# Patient Record
Sex: MALE | Race: WHITE | NOT HISPANIC OR LATINO | Employment: OTHER | URBAN - METROPOLITAN AREA
[De-identification: names, ages, dates, MRNs, and addresses within clinical notes are randomized per-mention and may not be internally consistent; named-entity substitution may affect disease eponyms.]

---

## 2017-01-04 ENCOUNTER — HOSPITAL ENCOUNTER (OUTPATIENT)
Dept: CARDIOLOGY | Facility: HOSPITAL | Age: 82
Discharge: HOME | End: 2017-01-04
Attending: INTERNAL MEDICINE | Admitting: INTERNAL MEDICINE

## 2017-03-23 ENCOUNTER — CONVERSION ENCOUNTER (OUTPATIENT)
Dept: CARDIOLOGY | Facility: CLINIC | Age: 82
End: 2017-03-23

## 2017-05-18 ENCOUNTER — CONVERSION ENCOUNTER (OUTPATIENT)
Dept: CARDIOLOGY | Facility: CLINIC | Age: 82
End: 2017-05-18

## 2017-12-13 ENCOUNTER — CONVERSION ENCOUNTER (OUTPATIENT)
Dept: CARDIOLOGY | Facility: CLINIC | Age: 82
End: 2017-12-13

## 2018-04-19 ENCOUNTER — CONVERSION ENCOUNTER (OUTPATIENT)
Dept: CARDIOLOGY | Facility: CLINIC | Age: 83
End: 2018-04-19

## 2018-10-10 ENCOUNTER — HOSPITAL ENCOUNTER (OUTPATIENT)
Dept: ONCOLOGY | Facility: HOSPITAL | Age: 83
Discharge: HOME | End: 2018-10-10
Attending: INTERNAL MEDICINE | Admitting: INTERNAL MEDICINE

## 2018-10-10 ENCOUNTER — HOSPITAL ENCOUNTER (OUTPATIENT)
Dept: ONCOLOGY | Facility: CLINIC | Age: 83
Setting detail: INFUSION SERIES
Discharge: HOME | End: 2018-10-10
Attending: INTERNAL MEDICINE | Admitting: INTERNAL MEDICINE

## 2018-10-10 ENCOUNTER — CLINICAL SUPPORT (OUTPATIENT)
Dept: ONCOLOGY | Facility: HOSPITAL | Age: 83
End: 2018-10-10

## 2018-10-10 LAB
IRON SATN MFR SERPL: 16 % (ref 20–50)
IRON SERPL-MCNC: 45 UG/DL (ref 45–182)
TIBC SERPL-MCNC: 280 UG/DL (ref 228–428)

## 2019-06-04 VITALS
SYSTOLIC BLOOD PRESSURE: 130 MMHG | OXYGEN SATURATION: 94 % | OXYGEN SATURATION: 95 % | OXYGEN SATURATION: 93 % | SYSTOLIC BLOOD PRESSURE: 143 MMHG | SYSTOLIC BLOOD PRESSURE: 132 MMHG | HEART RATE: 98 BPM | DIASTOLIC BLOOD PRESSURE: 80 MMHG | HEART RATE: 82 BPM | WEIGHT: 181 LBS | WEIGHT: 195 LBS | SYSTOLIC BLOOD PRESSURE: 143 MMHG | HEART RATE: 84 BPM | OXYGEN SATURATION: 91 % | HEART RATE: 87 BPM | WEIGHT: 191 LBS | DIASTOLIC BLOOD PRESSURE: 76 MMHG | WEIGHT: 185.5 LBS | DIASTOLIC BLOOD PRESSURE: 81 MMHG | DIASTOLIC BLOOD PRESSURE: 87 MMHG

## 2019-11-20 ENCOUNTER — CLINICAL SUPPORT NO REQUIREMENTS (OUTPATIENT)
Dept: CARDIOLOGY | Facility: CLINIC | Age: 84
End: 2019-11-20

## 2019-11-20 ENCOUNTER — TELEPHONE (OUTPATIENT)
Dept: CARDIOLOGY | Facility: CLINIC | Age: 84
End: 2019-11-20

## 2019-11-20 ENCOUNTER — APPOINTMENT (OUTPATIENT)
Dept: GENERAL RADIOLOGY | Facility: HOSPITAL | Age: 84
End: 2019-11-20

## 2019-11-20 ENCOUNTER — HOSPITAL ENCOUNTER (EMERGENCY)
Facility: HOSPITAL | Age: 84
Discharge: HOME | End: 2019-11-20
Attending: EMERGENCY MEDICINE | Admitting: EMERGENCY MEDICINE

## 2019-11-20 VITALS
DIASTOLIC BLOOD PRESSURE: 73 MMHG | BODY MASS INDEX: 29.55 KG/M2 | RESPIRATION RATE: 16 BRPM | WEIGHT: 156.53 LBS | SYSTOLIC BLOOD PRESSURE: 122 MMHG | TEMPERATURE: 98.1 F | HEART RATE: 61 BPM | OXYGEN SATURATION: 98 % | HEIGHT: 61 IN

## 2019-11-20 DIAGNOSIS — R55 SYNCOPE AND COLLAPSE: Primary | ICD-10-CM

## 2019-11-20 DIAGNOSIS — R07.9 CHEST PAIN, UNSPECIFIED TYPE: Primary | ICD-10-CM

## 2019-11-20 DIAGNOSIS — Z95.818 STATUS POST PLACEMENT OF IMPLANTABLE LOOP RECORDER: ICD-10-CM

## 2019-11-20 LAB
ANION GAP SERPL CALCULATED.3IONS-SCNC: 9 MMOL/L (ref 5–15)
BASOPHILS # BLD AUTO: 0 10*3/MM3 (ref 0–0.2)
BASOPHILS NFR BLD AUTO: 0.2 % (ref 0–1.5)
BUN BLD-MCNC: 12 MG/DL (ref 8–23)
BUN/CREAT SERPL: 13.5 (ref 7–25)
CALCIUM SPEC-SCNC: 8.9 MG/DL (ref 8.6–10.5)
CHLORIDE SERPL-SCNC: 100 MMOL/L (ref 98–107)
CO2 SERPL-SCNC: 29 MMOL/L (ref 22–29)
CREAT BLD-MCNC: 0.89 MG/DL (ref 0.76–1.27)
DEPRECATED RDW RBC AUTO: 55.6 FL (ref 37–54)
DIGOXIN SERPL-MCNC: 0.6 NG/ML (ref 0.6–1.2)
EOSINOPHIL # BLD AUTO: 0 10*3/MM3 (ref 0–0.4)
EOSINOPHIL NFR BLD AUTO: 0.3 % (ref 0.3–6.2)
ERYTHROCYTE [DISTWIDTH] IN BLOOD BY AUTOMATED COUNT: 16.1 % (ref 12.3–15.4)
GFR SERPL CREATININE-BSD FRML MDRD: 81 ML/MIN/1.73
GLUCOSE BLD-MCNC: 159 MG/DL (ref 65–99)
HCT VFR BLD AUTO: 39.4 % (ref 37.5–51)
HGB BLD-MCNC: 12.8 G/DL (ref 13–17.7)
HOLD SPECIMEN: NORMAL
LYMPHOCYTES # BLD AUTO: 1.9 10*3/MM3 (ref 0.7–3.1)
LYMPHOCYTES NFR BLD AUTO: 25.8 % (ref 19.6–45.3)
MCH RBC QN AUTO: 31.4 PG (ref 26.6–33)
MCHC RBC AUTO-ENTMCNC: 32.6 G/DL (ref 31.5–35.7)
MCV RBC AUTO: 96.4 FL (ref 79–97)
MONOCYTES # BLD AUTO: 1.6 10*3/MM3 (ref 0.1–0.9)
MONOCYTES NFR BLD AUTO: 22 % (ref 5–12)
NEUTROPHILS # BLD AUTO: 3.8 10*3/MM3 (ref 1.7–7)
NEUTROPHILS NFR BLD AUTO: 51.7 % (ref 42.7–76)
NRBC BLD AUTO-RTO: 0.1 /100 WBC (ref 0–0.2)
PLATELET # BLD AUTO: 88 10*3/MM3 (ref 140–450)
PMV BLD AUTO: 11 FL (ref 6–12)
POTASSIUM BLD-SCNC: 3.9 MMOL/L (ref 3.5–5.2)
RBC # BLD AUTO: 4.08 10*6/MM3 (ref 4.14–5.8)
SODIUM BLD-SCNC: 138 MMOL/L (ref 136–145)
TROPONIN T SERPL-MCNC: <0.01 NG/ML (ref 0–0.03)
WBC NRBC COR # BLD: 7.4 10*3/MM3 (ref 3.4–10.8)

## 2019-11-20 PROCEDURE — 84484 ASSAY OF TROPONIN QUANT: CPT | Performed by: EMERGENCY MEDICINE

## 2019-11-20 PROCEDURE — 93298 REM INTERROG DEV EVAL SCRMS: CPT | Performed by: INTERNAL MEDICINE

## 2019-11-20 PROCEDURE — 93299 PR REM INTERROG ICPMS/SCRMS <30 D TECH REVIEW: CPT | Performed by: INTERNAL MEDICINE

## 2019-11-20 PROCEDURE — 85025 COMPLETE CBC W/AUTO DIFF WBC: CPT | Performed by: EMERGENCY MEDICINE

## 2019-11-20 PROCEDURE — 93005 ELECTROCARDIOGRAM TRACING: CPT | Performed by: EMERGENCY MEDICINE

## 2019-11-20 PROCEDURE — 93005 ELECTROCARDIOGRAM TRACING: CPT

## 2019-11-20 PROCEDURE — 80048 BASIC METABOLIC PNL TOTAL CA: CPT | Performed by: EMERGENCY MEDICINE

## 2019-11-20 PROCEDURE — 71045 X-RAY EXAM CHEST 1 VIEW: CPT

## 2019-11-20 PROCEDURE — 99284 EMERGENCY DEPT VISIT MOD MDM: CPT

## 2019-11-20 PROCEDURE — 80162 ASSAY OF DIGOXIN TOTAL: CPT | Performed by: EMERGENCY MEDICINE

## 2019-11-20 RX ORDER — SODIUM CHLORIDE 0.9 % (FLUSH) 0.9 %
10 SYRINGE (ML) INJECTION AS NEEDED
Status: DISCONTINUED | OUTPATIENT
Start: 2019-11-20 | End: 2019-11-20 | Stop reason: HOSPADM

## 2019-11-20 NOTE — ED PROVIDER NOTES
Subjective   Patient is an 86-year-old male sent from his extended care facility due to shortness of breath.  There is question as to whether the patient had been defibrillated by a defibrillator implant.  Patient is unable to offer complaints due to dementia.  No other information is obtainable at this time            Review of Systems   Unable to perform ROS: Dementia       Past Medical History:   Diagnosis Date   • Anemia    • Atrial fibrillation (CMS/[facility])    • COPD (chronic obstructive pulmonary disease) (CMS/[facility])    • Dementia (CMS/[facility])    • Depression    • Diabetes mellitus (CMS/[facility])    • Hyperlipidemia    • Hypertension    • Lung cancer (CMS/[facility])    • Thrombocytopenia (CMS/[facility])        Allergies   Allergen Reactions   • Ibuprofen Unknown (See Comments)     Unknown     • Nsaids Unknown (See Comments)     unknown       Past Surgical History:   Procedure Laterality Date   • OTHER SURGICAL HISTORY      Loop Recorder        History reviewed. No pertinent family history.    Social History     Socioeconomic History   • Marital status:      Spouse name: Not on file   • Number of children: Not on file   • Years of education: Not on file   • Highest education level: Not on file   Substance and Sexual Activity   • Alcohol use: No     Frequency: Never     Comment: previous            Objective   Physical Exam  HEENT exam shows TMs to be clear.  Oropharynx, spit sclerae nonicteric.  Neck has no adenopathy JVD [place] lungs are clear.  Heart has a regular rate rhythm without murmur rub or gallop her chest is nontender.  Abdomen soft nontender.  Patient has normal bowel sounds her thyroid undergone.  Back has no CVA tenderness.  Extremity exam is no cyanosis or edema.  Procedures     My EKG interpretation shows sinus bradycardia with no acute ST change      ED Course        Results for orders placed or performed during the hospital encounter of 11/20/19   Basic Metabolic Panel   Result Value Ref Range    Glucose  159 (H) 65 - 99 mg/dL    BUN 12 8 - 23 mg/dL    Creatinine 0.89 0.76 - 1.27 mg/dL    Sodium 138 136 - 145 mmol/L    Potassium 3.9 3.5 - 5.2 mmol/L    Chloride 100 98 - 107 mmol/L    CO2 29.0 22.0 - 29.0 mmol/L    Calcium 8.9 8.6 - 10.5 mg/dL    eGFR Non African Amer 81 >60 mL/min/1.73    BUN/Creatinine Ratio 13.5 7.0 - 25.0    Anion Gap 9.0 5.0 - 15.0 mmol/L   Troponin   Result Value Ref Range    Troponin T <0.010 0.000 - 0.030 ng/mL   CBC Auto Differential   Result Value Ref Range    WBC 7.40 3.40 - 10.80 10*3/mm3    RBC 4.08 (L) 4.14 - 5.80 10*6/mm3    Hemoglobin 12.8 (L) 13.0 - 17.7 g/dL    Hematocrit 39.4 37.5 - 51.0 %    MCV 96.4 79.0 - 97.0 fL    MCH 31.4 26.6 - 33.0 pg    MCHC 32.6 31.5 - 35.7 g/dL    RDW 16.1 (H) 12.3 - 15.4 %    RDW-SD 55.6 (H) 37.0 - 54.0 fl    MPV 11.0 6.0 - 12.0 fL    Platelets 88 (L) 140 - 450 10*3/mm3    Neutrophil % 51.7 42.7 - 76.0 %    Lymphocyte % 25.8 19.6 - 45.3 %    Monocyte % 22.0 (H) 5.0 - 12.0 %    Eosinophil % 0.3 0.3 - 6.2 %    Basophil % 0.2 0.0 - 1.5 %    Neutrophils, Absolute 3.80 1.70 - 7.00 10*3/mm3    Lymphocytes, Absolute 1.90 0.70 - 3.10 10*3/mm3    Monocytes, Absolute 1.60 (H) 0.10 - 0.90 10*3/mm3    Eosinophils, Absolute 0.00 0.00 - 0.40 10*3/mm3    Basophils, Absolute 0.00 0.00 - 0.20 10*3/mm3    nRBC 0.1 0.0 - 0.2 /100 WBC   Digoxin Level   Result Value Ref Range    Digoxin 0.60 0.60 - 1.20 ng/mL     Xr Chest 1 View    Result Date: 11/20/2019  Probably no acute infiltrate is identified.  Electronically Signed By-Dr. Osbaldo Curiel MD On:11/20/2019 10:50 AM This report was finalized on [id number] by Dr. Osbaldo Curiel MD.              MDM  Number of Diagnoses or Management Options  Diagnosis management comments: Patient does not have a defibrillator implanted.  The patient does have a loop recorder.  When this was interrogated it is noted that the patient has had several episodes of bradycardia with a rate of 40-50 lasting only 4 to 8 seconds over the past  several months.  No other ectopy was noted.  Patient has no evidence of acute current syndrome based on EKG and troponin.  Chest x-ray shows no acute pulmonary disease.  There is no evidence of pneumonia.  Metabolic panel is normal.  Patient remained at his baseline with normal vital signs throughout his ED visit.  Will be discharged back to his extended care facility and will see his MD for recheck.    Risk of Complications, Morbidity, and/or Mortality  Presenting problems: high  Diagnostic procedures: high  Management options: high    Patient Progress  Patient progress: stable      Final diagnoses:   Chest pain, unspecified type              Cooper Mercado MD  11/20/19 3117

## 2019-11-20 NOTE — TELEPHONE ENCOUNTER
CALL FROM SALVADOR @ [facility]. PATIENT LET THEM KNOW THIS MORNING HIS DEFIB HAS FIRED 3 TIMES OVER THE LAST TWO DAYS. COMPLAINING OF CHEST PAIN. MADE APT TODAY @ 10:30. SPOKE TO GERMAN, LET HER KNOW SITUATION. SHE ADVISED PATIENT GO TO ER. I CALLED BACK [facility] AND SPOKE WITH BRADLEY. LET HER KNOW WE WERE ADVISING ER. BRADLEY UNDERSTOOD. APT [facility].

## 2019-11-20 NOTE — TELEPHONE ENCOUNTER
[facility] ER called, confirmed patient has not been seen since 2018 in office and had a [employer] loop recorder at this time. Went to [facility] since, not sure what device patient has at this time.

## 2019-12-23 ENCOUNTER — CLINICAL SUPPORT NO REQUIREMENTS (OUTPATIENT)
Dept: CARDIOLOGY | Facility: CLINIC | Age: 84
End: 2019-12-23

## 2019-12-23 DIAGNOSIS — Z95.818 STATUS POST PLACEMENT OF IMPLANTABLE LOOP RECORDER: Primary | ICD-10-CM

## 2019-12-23 DIAGNOSIS — I48.0 PAROXYSMAL ATRIAL FIBRILLATION (HCC): ICD-10-CM

## 2019-12-23 NOTE — PROGRESS NOTES
NC, [facility] received on 11/20/19 from Hosp. Loop not connecting at this time, patient in facility. ewa

## 2020-04-15 ENCOUNTER — TELEPHONE (OUTPATIENT)
Dept: CARDIOLOGY | Facility: CLINIC | Age: 85
End: 2020-04-15

## 2020-04-15 NOTE — TELEPHONE ENCOUNTER
Brianne Reinoso with Hospice called, confirmed patient has [employer] Loop recorder, not PM or ICD.

## 2020-06-05 ENCOUNTER — APPOINTMENT (OUTPATIENT)
Dept: GENERAL RADIOLOGY | Facility: HOSPITAL | Age: 85
End: 2020-06-05

## 2020-06-05 ENCOUNTER — APPOINTMENT (OUTPATIENT)
Dept: CARDIOLOGY | Facility: HOSPITAL | Age: 85
End: 2020-06-05

## 2020-06-05 ENCOUNTER — HOSPITAL ENCOUNTER (OUTPATIENT)
Facility: HOSPITAL | Age: 85
Setting detail: OBSERVATION
Discharge: NURSING, REHAB OR LONG-TERM CARE | End: 2020-06-08
Attending: EMERGENCY MEDICINE | Admitting: INTERNAL MEDICINE

## 2020-06-05 ENCOUNTER — APPOINTMENT (OUTPATIENT)
Dept: CT IMAGING | Facility: HOSPITAL | Age: 85
End: 2020-06-05

## 2020-06-05 DIAGNOSIS — J90 PLEURAL EFFUSION: ICD-10-CM

## 2020-06-05 DIAGNOSIS — S01.01XA LACERATION OF SCALP, INITIAL ENCOUNTER: ICD-10-CM

## 2020-06-05 DIAGNOSIS — F11.20 NARCOTIC DEPENDENCE (HCC): ICD-10-CM

## 2020-06-05 DIAGNOSIS — T14.8XXA MULTIPLE SKIN TEARS: ICD-10-CM

## 2020-06-05 DIAGNOSIS — R55 SYNCOPE AND COLLAPSE: Primary | ICD-10-CM

## 2020-06-05 PROBLEM — D64.9 ANEMIA: Chronic | Status: ACTIVE | Noted: 2019-08-16

## 2020-06-05 PROBLEM — R45.1 RESTLESSNESS AND AGITATION: Status: ACTIVE | Noted: 2019-08-16
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
## 2020-06-05 PROBLEM — M62.81 MUSCLE WEAKNESS: Status: ACTIVE | Noted: 2019-08-16

## 2020-06-05 PROBLEM — E78.5 HYPERLIPIDEMIA: Chronic | Status: ACTIVE | Noted: 2020-06-05

## 2020-06-05 PROBLEM — J44.9 CHRONIC OBSTRUCTIVE LUNG DISEASE (HCC): Chronic | Status: ACTIVE | Noted: 2019-08-16

## 2020-06-05 PROBLEM — R73.09 ELEVATED RANDOM BLOOD GLUCOSE LEVEL: Status: ACTIVE | Noted: 2020-06-05

## 2020-06-05 PROBLEM — E78.5 HYPERLIPIDEMIA: Status: ACTIVE | Noted: 2020-06-05

## 2020-06-05 PROBLEM — I48.0 PAROXYSMAL ATRIAL FIBRILLATION (HCC): Chronic | Status: ACTIVE | Noted: 2019-12-23

## 2020-06-05 PROBLEM — F03.90 DEMENTIA (HCC): Chronic | Status: ACTIVE | Noted: 2019-08-16

## 2020-06-05 PROBLEM — K21.9 GASTROESOPHAGEAL REFLUX DISEASE: Chronic | Status: ACTIVE | Noted: 2019-08-16

## 2020-06-05 PROBLEM — J44.9 CHRONIC OBSTRUCTIVE LUNG DISEASE (HCC): Status: ACTIVE | Noted: 2019-08-16

## 2020-06-05 PROBLEM — D64.9 ANEMIA: Status: ACTIVE | Noted: 2019-08-16

## 2020-06-05 PROBLEM — I10 HYPERTENSION: Status: ACTIVE | Noted: 2019-08-16

## 2020-06-05 PROBLEM — C34.90 MALIGNANT NEOPLASM OF LUNG (HCC): Status: ACTIVE | Noted: 2019-08-16

## 2020-06-05 PROBLEM — F03.90 DEMENTIA (HCC): Status: ACTIVE | Noted: 2019-08-16

## 2020-06-05 PROBLEM — G40.909 SEIZURE DISORDER (HCC): Chronic | Status: ACTIVE | Noted: 2020-06-05

## 2020-06-05 PROBLEM — Z95.0 CARDIAC PACEMAKER IN SITU: Status: ACTIVE | Noted: 2019-08-16

## 2020-06-05 PROBLEM — I10 HYPERTENSION: Chronic | Status: ACTIVE | Noted: 2019-08-16

## 2020-06-05 PROBLEM — K21.9 GASTROESOPHAGEAL REFLUX DISEASE: Status: ACTIVE | Noted: 2019-08-16

## 2020-06-05 LAB
ACANTHOCYTES BLD QL SMEAR: ABNORMAL
ALBUMIN SERPL-MCNC: 3.1 G/DL (ref 3.5–5.2)
ALBUMIN/GLOB SERPL: 1.3 G/DL
ALP SERPL-CCNC: 79 U/L (ref 39–117)
ALT SERPL W P-5'-P-CCNC: 15 U/L (ref 1–41)
ANION GAP SERPL CALCULATED.3IONS-SCNC: 6 MMOL/L (ref 5–15)
ANION GAP SERPL CALCULATED.3IONS-SCNC: 8 MMOL/L (ref 5–15)
ANISOCYTOSIS BLD QL: ABNORMAL
APTT PPP: 24.1 SECONDS (ref 24–31)
AST SERPL-CCNC: 17 U/L (ref 1–40)
BH CV ECHO MEAS - % IVS THICK: 49.4 %
BH CV ECHO MEAS - % LVPW THICK: 25.9 %
BH CV ECHO MEAS - ACS: 2 CM
BH CV ECHO MEAS - AO MAX PG (FULL): 1.4 MMHG
BH CV ECHO MEAS - AO MAX PG: 3.7 MMHG
BH CV ECHO MEAS - AO MEAN PG (FULL): 0.97 MMHG
BH CV ECHO MEAS - AO MEAN PG: 2.3 MMHG
BH CV ECHO MEAS - AO ROOT AREA (BSA CORRECTED): 2.1
BH CV ECHO MEAS - AO ROOT AREA: 9.7 CM^2
BH CV ECHO MEAS - AO ROOT DIAM: 3.5 CM
BH CV ECHO MEAS - AO V2 MAX: 96.4 CM/SEC
BH CV ECHO MEAS - AO V2 MEAN: 73.9 CM/SEC
BH CV ECHO MEAS - AO V2 VTI: 16.9 CM
BH CV ECHO MEAS - AORTIC HR: 262.8 BPM
BH CV ECHO MEAS - AORTIC R-R: 0.23 SEC
BH CV ECHO MEAS - AVA(I,A): 3.5 CM^2
BH CV ECHO MEAS - AVA(I,D): 3.5 CM^2
BH CV ECHO MEAS - AVA(V,A): 3.3 CM^2
BH CV ECHO MEAS - AVA(V,D): 3.3 CM^2
BH CV ECHO MEAS - BSA(HAYCOCK): 1.8 M^2
BH CV ECHO MEAS - BSA: 1.7 M^2
BH CV ECHO MEAS - BZI_BMI: 29.5 KILOGRAMS/M^2
BH CV ECHO MEAS - BZI_METRIC_HEIGHT: 154.9 CM
BH CV ECHO MEAS - BZI_METRIC_WEIGHT: 70.8 KG
BH CV ECHO MEAS - CI(AO): 25.4 L/MIN/M^2
BH CV ECHO MEAS - CI(LVOT): 9.1 L/MIN/M^2
BH CV ECHO MEAS - CO(AO): 43.1 L/MIN
BH CV ECHO MEAS - CO(LVOT): 15.4 L/MIN
BH CV ECHO MEAS - EDV(CUBED): 94.9 ML
BH CV ECHO MEAS - EDV(MOD-SP4): 68.3 ML
BH CV ECHO MEAS - EDV(TEICH): 95.4 ML
BH CV ECHO MEAS - EF(CUBED): 79.8 %
BH CV ECHO MEAS - EF(MOD-BP): 72 %
BH CV ECHO MEAS - EF(MOD-SP4): 61.2 %
BH CV ECHO MEAS - EF(TEICH): 72.3 %
BH CV ECHO MEAS - ESV(CUBED): 19.2 ML
BH CV ECHO MEAS - ESV(MOD-SP4): 26.5 ML
BH CV ECHO MEAS - ESV(TEICH): 26.5 ML
BH CV ECHO MEAS - FS: 41.3 %
BH CV ECHO MEAS - IVS/LVPW: 0.98
BH CV ECHO MEAS - IVSD: 1.1 CM
BH CV ECHO MEAS - IVSS: 1.7 CM
BH CV ECHO MEAS - LA DIMENSION(2D): 3.6 CM
BH CV ECHO MEAS - LV DIASTOLIC VOL/BSA (35-75): 40.2 ML/M^2
BH CV ECHO MEAS - LV MASS(C)D: 183.2 GRAMS
BH CV ECHO MEAS - LV MASS(C)DI: 107.8 GRAMS/M^2
BH CV ECHO MEAS - LV MASS(C)S: 143.3 GRAMS
BH CV ECHO MEAS - LV MASS(C)SI: 84.3 GRAMS/M^2
BH CV ECHO MEAS - LV MAX PG: 2.4 MMHG
BH CV ECHO MEAS - LV MEAN PG: 1.4 MMHG
BH CV ECHO MEAS - LV SYSTOLIC VOL/BSA (12-30): 15.6 ML/M^2
BH CV ECHO MEAS - LV V1 MAX: 76.8 CM/SEC
BH CV ECHO MEAS - LV V1 MEAN: 54.4 CM/SEC
BH CV ECHO MEAS - LV V1 VTI: 14.1 CM
BH CV ECHO MEAS - LVIDD: 4.6 CM
BH CV ECHO MEAS - LVIDS: 2.7 CM
BH CV ECHO MEAS - LVOT AREA: 4.2 CM^2
BH CV ECHO MEAS - LVOT DIAM: 2.3 CM
BH CV ECHO MEAS - LVPWD: 1.1 CM
BH CV ECHO MEAS - LVPWS: 1.4 CM
BH CV ECHO MEAS - MV A MAX VEL: 104 CM/SEC
BH CV ECHO MEAS - MV DEC SLOPE: 355.8 CM/SEC^2
BH CV ECHO MEAS - MV DEC TIME: 0.22 SEC
BH CV ECHO MEAS - MV E MAX VEL: 76.5 CM/SEC
BH CV ECHO MEAS - MV E/A: 0.74
BH CV ECHO MEAS - MV MAX PG: 4.4 MMHG
BH CV ECHO MEAS - MV MEAN PG: 1.7 MMHG
BH CV ECHO MEAS - MV V2 MAX: 104.5 CM/SEC
BH CV ECHO MEAS - MV V2 MEAN: 60.6 CM/SEC
BH CV ECHO MEAS - MV V2 VTI: 17.6 CM
BH CV ECHO MEAS - MVA(VTI): 3.3 CM^2
BH CV ECHO MEAS - PA ACC TIME: 0.11 SEC
BH CV ECHO MEAS - PA MAX PG (FULL): 0.7 MMHG
BH CV ECHO MEAS - PA MAX PG: 2.6 MMHG
BH CV ECHO MEAS - PA MEAN PG (FULL): 0.54 MMHG
BH CV ECHO MEAS - PA MEAN PG: 1.5 MMHG
BH CV ECHO MEAS - PA PR(ACCEL): 27.6 MMHG
BH CV ECHO MEAS - PA V2 MAX: 80.4 CM/SEC
BH CV ECHO MEAS - PA V2 MEAN: 57.5 CM/SEC
BH CV ECHO MEAS - PA V2 VTI: 17.1 CM
BH CV ECHO MEAS - RAP SYSTOLE: 3 MMHG
BH CV ECHO MEAS - RV MAX PG: 1.9 MMHG
BH CV ECHO MEAS - RV MEAN PG: 0.91 MMHG
BH CV ECHO MEAS - RV V1 MAX: 68.6 CM/SEC
BH CV ECHO MEAS - RV V1 MEAN: 44.5 CM/SEC
BH CV ECHO MEAS - RV V1 VTI: 12.8 CM
BH CV ECHO MEAS - RVDD: 3.6 CM
BH CV ECHO MEAS - RVSP: 27.6 MMHG
BH CV ECHO MEAS - SI(AO): 96.5 ML/M^2
BH CV ECHO MEAS - SI(CUBED): 44.6 ML/M^2
BH CV ECHO MEAS - SI(LVOT): 34.5 ML/M^2
BH CV ECHO MEAS - SI(MOD-SP4): 24.6 ML/M^2
BH CV ECHO MEAS - SI(TEICH): 40.6 ML/M^2
BH CV ECHO MEAS - SV(AO): 164.1 ML
BH CV ECHO MEAS - SV(CUBED): 75.7 ML
BH CV ECHO MEAS - SV(LVOT): 58.7 ML
BH CV ECHO MEAS - SV(MOD-SP4): 41.8 ML
BH CV ECHO MEAS - SV(TEICH): 69 ML
BH CV ECHO MEAS - TR MAX VEL: 248 CM/SEC
BH CV XLRA MEAS LEFT DIST CCA EDV: 11 CM/SEC
BH CV XLRA MEAS LEFT DIST CCA PSV: 71 CM/SEC
BH CV XLRA MEAS LEFT DIST ICA EDV: 20 CM/SEC
BH CV XLRA MEAS LEFT DIST ICA PSV: 91 CM/SEC
BH CV XLRA MEAS LEFT ICA/CCA RATIO: 1.3
BH CV XLRA MEAS LEFT PROX CCA EDV: 7 CM/SEC
BH CV XLRA MEAS LEFT PROX CCA PSV: 70 CM/SEC
BH CV XLRA MEAS LEFT PROX ECA PSV: 39 CM/SEC
BH CV XLRA MEAS LEFT PROX ICA EDV: 11 CM/SEC
BH CV XLRA MEAS LEFT PROX ICA PSV: 74 CM/SEC
BH CV XLRA MEAS LEFT PROX SCLA PSV: 141 CM/SEC
BH CV XLRA MEAS LEFT VERTEBRAL A PSV: 71 CM/SEC
BH CV XLRA MEAS RIGHT DIST CCA EDV: 9 CM/SEC
BH CV XLRA MEAS RIGHT DIST CCA PSV: 54 CM/SEC
BH CV XLRA MEAS RIGHT DIST ICA EDV: 15 CM/SEC
BH CV XLRA MEAS RIGHT DIST ICA PSV: 78 CM/SEC
BH CV XLRA MEAS RIGHT ICA/CCA RATIO: 1
BH CV XLRA MEAS RIGHT PROX CCA EDV: 13 CM/SEC
BH CV XLRA MEAS RIGHT PROX CCA PSV: 80 CM/SEC
BH CV XLRA MEAS RIGHT PROX ECA PSV: 63 CM/SEC
BH CV XLRA MEAS RIGHT PROX ICA EDV: 14 CM/SEC
BH CV XLRA MEAS RIGHT PROX ICA PSV: 76 CM/SEC
BH CV XLRA MEAS RIGHT PROX SCLA PSV: 133 CM/SEC
BH CV XLRA MEAS RIGHT VERTEBRAL A PSV: 62 CM/SEC
BILIRUB SERPL-MCNC: 0.3 MG/DL (ref 0.2–1.2)
BUN BLD-MCNC: 12 MG/DL (ref 8–23)
BUN BLD-MCNC: 9 MG/DL (ref 8–23)
BUN BLD-MCNC: ABNORMAL MG/DL
BUN BLD-MCNC: ABNORMAL MG/DL
BUN/CREAT SERPL: ABNORMAL
BUN/CREAT SERPL: ABNORMAL
CALCIUM SPEC-SCNC: 8.3 MG/DL (ref 8.6–10.5)
CALCIUM SPEC-SCNC: 8.4 MG/DL (ref 8.6–10.5)
CHLORIDE SERPL-SCNC: 104 MMOL/L (ref 98–107)
CHLORIDE SERPL-SCNC: 104 MMOL/L (ref 98–107)
CO2 SERPL-SCNC: 28 MMOL/L (ref 22–29)
CO2 SERPL-SCNC: 29 MMOL/L (ref 22–29)
CREAT BLD-MCNC: 0.51 MG/DL (ref 0.76–1.27)
CREAT BLD-MCNC: 0.64 MG/DL (ref 0.76–1.27)
DACRYOCYTES BLD QL SMEAR: ABNORMAL
DEPRECATED RDW RBC AUTO: 61.3 FL (ref 37–54)
DEPRECATED RDW RBC AUTO: 64.3 FL (ref 37–54)
ERYTHROCYTE [DISTWIDTH] IN BLOOD BY AUTOMATED COUNT: 18.2 % (ref 12.3–15.4)
ERYTHROCYTE [DISTWIDTH] IN BLOOD BY AUTOMATED COUNT: 18.7 % (ref 12.3–15.4)
GFR SERPL CREATININE-BSD FRML MDRD: 118 ML/MIN/1.73
GFR SERPL CREATININE-BSD FRML MDRD: >150 ML/MIN/1.73
GIANT PLATELETS: ABNORMAL
GLOBULIN UR ELPH-MCNC: 2.4 GM/DL
GLUCOSE BLD-MCNC: 105 MG/DL (ref 65–99)
GLUCOSE BLD-MCNC: 108 MG/DL (ref 65–99)
HCT VFR BLD AUTO: 29.5 % (ref 37.5–51)
HCT VFR BLD AUTO: 32.5 % (ref 37.5–51)
HGB BLD-MCNC: 10.3 G/DL (ref 13–17.7)
HGB BLD-MCNC: 9.4 G/DL (ref 13–17.7)
HOLD SPECIMEN: NORMAL
INR PPP: 1.12 (ref 0.9–1.1)
LARGE PLATELETS: ABNORMAL
LYMPHOCYTES # BLD MANUAL: 1.07 10*3/MM3 (ref 0.7–3.1)
LYMPHOCYTES # BLD MANUAL: 1.31 10*3/MM3 (ref 0.7–3.1)
LYMPHOCYTES NFR BLD MANUAL: 17 % (ref 19.6–45.3)
LYMPHOCYTES NFR BLD MANUAL: 17 % (ref 5–12)
LYMPHOCYTES NFR BLD MANUAL: 19 % (ref 19.6–45.3)
LYMPHOCYTES NFR BLD MANUAL: 24 % (ref 5–12)
MAXIMAL PREDICTED HEART RATE: 133 BPM
MCH RBC QN AUTO: 30.3 PG (ref 26.6–33)
MCH RBC QN AUTO: 30.6 PG (ref 26.6–33)
MCHC RBC AUTO-ENTMCNC: 31.7 G/DL (ref 31.5–35.7)
MCHC RBC AUTO-ENTMCNC: 31.9 G/DL (ref 31.5–35.7)
MCV RBC AUTO: 95.1 FL (ref 79–97)
MCV RBC AUTO: 96.5 FL (ref 79–97)
METAMYELOCYTES NFR BLD MANUAL: 2 % (ref 0–0)
MONOCYTES # BLD AUTO: 1.07 10*3/MM3 (ref 0.1–0.9)
MONOCYTES # BLD AUTO: 1.66 10*3/MM3 (ref 0.1–0.9)
MYELOCYTES NFR BLD MANUAL: 1 % (ref 0–0)
NEUTROPHILS # BLD AUTO: 3.84 10*3/MM3 (ref 1.7–7)
NEUTROPHILS # BLD AUTO: 3.93 10*3/MM3 (ref 1.7–7)
NEUTROPHILS NFR BLD MANUAL: 48 % (ref 42.7–76)
NEUTROPHILS NFR BLD MANUAL: 53 % (ref 42.7–76)
NEUTS BAND NFR BLD MANUAL: 13 % (ref 0–5)
NEUTS BAND NFR BLD MANUAL: 4 % (ref 0–5)
NT-PROBNP SERPL-MCNC: 1296 PG/ML (ref 5–1800)
PLATELET # BLD AUTO: 87 10*3/MM3 (ref 140–450)
PLATELET # BLD AUTO: 89 10*3/MM3 (ref 140–450)
PMV BLD AUTO: 10 FL (ref 6–12)
PMV BLD AUTO: 9.9 FL (ref 6–12)
POIKILOCYTOSIS BLD QL SMEAR: ABNORMAL
POIKILOCYTOSIS BLD QL SMEAR: ABNORMAL
POLYCHROMASIA BLD QL SMEAR: ABNORMAL
POTASSIUM BLD-SCNC: 3.8 MMOL/L (ref 3.5–5.2)
POTASSIUM BLD-SCNC: 3.8 MMOL/L (ref 3.5–5.2)
PROT SERPL-MCNC: 5.5 G/DL (ref 6–8.5)
PROTHROMBIN TIME: 11.5 SECONDS (ref 9.6–11.7)
RBC # BLD AUTO: 3.1 10*6/MM3 (ref 4.14–5.8)
RBC # BLD AUTO: 3.37 10*6/MM3 (ref 4.14–5.8)
SCAN SLIDE: NORMAL
SCAN SLIDE: NORMAL
SMALL PLATELETS BLD QL SMEAR: ABNORMAL
SMALL PLATELETS BLD QL SMEAR: ABNORMAL
SODIUM BLD-SCNC: 139 MMOL/L (ref 136–145)
SODIUM BLD-SCNC: 140 MMOL/L (ref 136–145)
STRESS TARGET HR: 113 BPM
TROPONIN T SERPL-MCNC: <0.01 NG/ML (ref 0–0.03)
VALPROATE SERPL-MCNC: 57.8 MCG/ML (ref 50–125)
VARIANT LYMPHS NFR BLD MANUAL: 2 % (ref 0–5)
WBC MORPH BLD: NORMAL
WBC MORPH BLD: NORMAL
WBC NRBC COR # BLD: 6.3 10*3/MM3 (ref 3.4–10.8)
WBC NRBC COR # BLD: 6.9 10*3/MM3 (ref 3.4–10.8)

## 2020-06-05 PROCEDURE — 93005 ELECTROCARDIOGRAM TRACING: CPT | Performed by: EMERGENCY MEDICINE

## 2020-06-05 PROCEDURE — 90471 IMMUNIZATION ADMIN: CPT | Performed by: EMERGENCY MEDICINE

## 2020-06-05 PROCEDURE — 72125 CT NECK SPINE W/O DYE: CPT

## 2020-06-05 PROCEDURE — G0378 HOSPITAL OBSERVATION PER HR: HCPCS

## 2020-06-05 PROCEDURE — 80053 COMPREHEN METABOLIC PANEL: CPT | Performed by: EMERGENCY MEDICINE

## 2020-06-05 PROCEDURE — 90715 TDAP VACCINE 7 YRS/> IM: CPT | Performed by: EMERGENCY MEDICINE

## 2020-06-05 PROCEDURE — 84520 ASSAY OF UREA NITROGEN: CPT | Performed by: STUDENT IN AN ORGANIZED HEALTH CARE EDUCATION/TRAINING PROGRAM

## 2020-06-05 PROCEDURE — 99219 PR INITIAL OBSERVATION CARE/DAY 50 MINUTES: CPT | Performed by: INTERNAL MEDICINE

## 2020-06-05 PROCEDURE — 99285 EMERGENCY DEPT VISIT HI MDM: CPT

## 2020-06-05 PROCEDURE — 93880 EXTRACRANIAL BILAT STUDY: CPT

## 2020-06-05 PROCEDURE — 85007 BL SMEAR W/DIFF WBC COUNT: CPT | Performed by: EMERGENCY MEDICINE

## 2020-06-05 PROCEDURE — 85007 BL SMEAR W/DIFF WBC COUNT: CPT | Performed by: STUDENT IN AN ORGANIZED HEALTH CARE EDUCATION/TRAINING PROGRAM

## 2020-06-05 PROCEDURE — 94799 UNLISTED PULMONARY SVC/PX: CPT

## 2020-06-05 PROCEDURE — 25010000002 TDAP 5-2.5-18.5 LF-MCG/0.5 SUSPENSION: Performed by: EMERGENCY MEDICINE

## 2020-06-05 PROCEDURE — 72170 X-RAY EXAM OF PELVIS: CPT

## 2020-06-05 PROCEDURE — 85025 COMPLETE CBC W/AUTO DIFF WBC: CPT | Performed by: STUDENT IN AN ORGANIZED HEALTH CARE EDUCATION/TRAINING PROGRAM

## 2020-06-05 PROCEDURE — 93306 TTE W/DOPPLER COMPLETE: CPT

## 2020-06-05 PROCEDURE — 93306 TTE W/DOPPLER COMPLETE: CPT | Performed by: INTERNAL MEDICINE

## 2020-06-05 PROCEDURE — 83880 ASSAY OF NATRIURETIC PEPTIDE: CPT | Performed by: EMERGENCY MEDICINE

## 2020-06-05 PROCEDURE — 70450 CT HEAD/BRAIN W/O DYE: CPT

## 2020-06-05 PROCEDURE — 71045 X-RAY EXAM CHEST 1 VIEW: CPT

## 2020-06-05 PROCEDURE — 85610 PROTHROMBIN TIME: CPT | Performed by: EMERGENCY MEDICINE

## 2020-06-05 PROCEDURE — 85025 COMPLETE CBC W/AUTO DIFF WBC: CPT | Performed by: EMERGENCY MEDICINE

## 2020-06-05 PROCEDURE — 80164 ASSAY DIPROPYLACETIC ACD TOT: CPT | Performed by: EMERGENCY MEDICINE

## 2020-06-05 PROCEDURE — 84484 ASSAY OF TROPONIN QUANT: CPT | Performed by: EMERGENCY MEDICINE

## 2020-06-05 PROCEDURE — 85730 THROMBOPLASTIN TIME PARTIAL: CPT | Performed by: EMERGENCY MEDICINE

## 2020-06-05 RX ORDER — ISOSORBIDE MONONITRATE 30 MG/1
30 TABLET, EXTENDED RELEASE ORAL DAILY
COMMUNITY

## 2020-06-05 RX ORDER — ASPIRIN 81 MG/1
81 TABLET ORAL DAILY
Status: DISCONTINUED | OUTPATIENT
Start: 2020-06-05 | End: 2020-06-08 | Stop reason: HOSPADM

## 2020-06-05 RX ORDER — ALBUTEROL SULFATE 2.5 MG/3ML
2.5 SOLUTION RESPIRATORY (INHALATION)
Status: DISCONTINUED | OUTPATIENT
Start: 2020-06-05 | End: 2020-06-06

## 2020-06-05 RX ORDER — ALBUTEROL SULFATE 90 UG/1
1 AEROSOL, METERED RESPIRATORY (INHALATION)
COMMUNITY
End: 2020-06-08 | Stop reason: HOSPADM

## 2020-06-05 RX ORDER — DONEPEZIL HYDROCHLORIDE 5 MG/1
20 TABLET, FILM COATED ORAL NIGHTLY
Status: DISCONTINUED | OUTPATIENT
Start: 2020-06-05 | End: 2020-06-08 | Stop reason: HOSPADM

## 2020-06-05 RX ORDER — ACETAMINOPHEN 325 MG/1
650 TABLET ORAL EVERY 6 HOURS PRN
Status: DISCONTINUED | OUTPATIENT
Start: 2020-06-05 | End: 2020-06-08 | Stop reason: HOSPADM

## 2020-06-05 RX ORDER — SODIUM CHLORIDE 0.9 % (FLUSH) 0.9 %
10 SYRINGE (ML) INJECTION EVERY 12 HOURS SCHEDULED
Status: DISCONTINUED | OUTPATIENT
Start: 2020-06-05 | End: 2020-06-08 | Stop reason: HOSPADM

## 2020-06-05 RX ORDER — ATORVASTATIN CALCIUM 20 MG/1
20 TABLET, FILM COATED ORAL NIGHTLY
COMMUNITY

## 2020-06-05 RX ORDER — CLOPIDOGREL BISULFATE 75 MG/1
75 TABLET ORAL DAILY
COMMUNITY

## 2020-06-05 RX ORDER — ATORVASTATIN CALCIUM 20 MG/1
20 TABLET, FILM COATED ORAL NIGHTLY
Status: DISCONTINUED | OUTPATIENT
Start: 2020-06-05 | End: 2020-06-08 | Stop reason: HOSPADM

## 2020-06-05 RX ORDER — ESCITALOPRAM OXALATE 10 MG/1
5 TABLET ORAL DAILY
Status: DISCONTINUED | OUTPATIENT
Start: 2020-06-05 | End: 2020-06-08 | Stop reason: HOSPADM

## 2020-06-05 RX ORDER — MORPHINE SULFATE 10 MG/.5ML
5 SOLUTION ORAL EVERY 4 HOURS PRN
Status: DISCONTINUED | OUTPATIENT
Start: 2020-06-05 | End: 2020-06-08 | Stop reason: HOSPADM

## 2020-06-05 RX ORDER — NITROGLYCERIN 0.4 MG/1
0.4 TABLET SUBLINGUAL
Status: DISCONTINUED | OUTPATIENT
Start: 2020-06-05 | End: 2020-06-08 | Stop reason: HOSPADM

## 2020-06-05 RX ORDER — MEMANTINE HYDROCHLORIDE 10 MG/1
10 TABLET ORAL 2 TIMES DAILY
COMMUNITY

## 2020-06-05 RX ORDER — CHOLECALCIFEROL (VITAMIN D3) 125 MCG
5 CAPSULE ORAL NIGHTLY PRN
Status: DISCONTINUED | OUTPATIENT
Start: 2020-06-05 | End: 2020-06-08 | Stop reason: HOSPADM

## 2020-06-05 RX ORDER — POTASSIUM CHLORIDE 20 MEQ/1
20 TABLET, EXTENDED RELEASE ORAL DAILY
COMMUNITY

## 2020-06-05 RX ORDER — MAGNESIUM HYDROXIDE/ALUMINUM HYDROXICE/SIMETHICONE 120; 1200; 1200 MG/30ML; MG/30ML; MG/30ML
30 SUSPENSION ORAL EVERY 6 HOURS PRN
Status: DISCONTINUED | OUTPATIENT
Start: 2020-06-05 | End: 2020-06-08 | Stop reason: HOSPADM

## 2020-06-05 RX ORDER — GUAIFENESIN 600 MG/1
1200 TABLET, EXTENDED RELEASE ORAL EVERY 12 HOURS SCHEDULED
Status: DISCONTINUED | OUTPATIENT
Start: 2020-06-05 | End: 2020-06-08 | Stop reason: HOSPADM

## 2020-06-05 RX ORDER — MORPHINE SULFATE 10 MG/.5ML
5 SOLUTION ORAL EVERY 4 HOURS PRN
Status: ON HOLD | COMMUNITY
End: 2020-06-08 | Stop reason: SDUPTHER

## 2020-06-05 RX ORDER — ALBUTEROL SULFATE 90 UG/1
1 AEROSOL, METERED RESPIRATORY (INHALATION) EVERY 4 HOURS PRN
COMMUNITY

## 2020-06-05 RX ORDER — ACETAMINOPHEN 325 MG/1
650 TABLET ORAL EVERY 6 HOURS PRN
COMMUNITY

## 2020-06-05 RX ORDER — SODIUM CHLORIDE 0.9 % (FLUSH) 0.9 %
10 SYRINGE (ML) INJECTION AS NEEDED
Status: DISCONTINUED | OUTPATIENT
Start: 2020-06-05 | End: 2020-06-08 | Stop reason: HOSPADM

## 2020-06-05 RX ORDER — LORAZEPAM 0.5 MG/1
0.5 TABLET ORAL 3 TIMES DAILY
COMMUNITY
End: 2020-06-08 | Stop reason: HOSPADM

## 2020-06-05 RX ORDER — DIGOXIN 250 MCG
250 TABLET ORAL
Status: DISCONTINUED | OUTPATIENT
Start: 2020-06-05 | End: 2020-06-08 | Stop reason: HOSPADM

## 2020-06-05 RX ORDER — LORAZEPAM 0.5 MG/1
0.5 TABLET ORAL 3 TIMES DAILY
Status: DISCONTINUED | OUTPATIENT
Start: 2020-06-05 | End: 2020-06-08 | Stop reason: HOSPADM

## 2020-06-05 RX ORDER — DIGOXIN 250 MCG
250 TABLET ORAL
COMMUNITY

## 2020-06-05 RX ORDER — FUROSEMIDE 40 MG/1
40 TABLET ORAL DAILY
COMMUNITY

## 2020-06-05 RX ORDER — GUAIFENESIN 600 MG/1
1200 TABLET, EXTENDED RELEASE ORAL 2 TIMES DAILY
COMMUNITY
End: 2020-06-08 | Stop reason: HOSPADM

## 2020-06-05 RX ORDER — ONDANSETRON 4 MG/1
4 TABLET, FILM COATED ORAL EVERY 6 HOURS PRN
Status: DISCONTINUED | OUTPATIENT
Start: 2020-06-05 | End: 2020-06-08 | Stop reason: HOSPADM

## 2020-06-05 RX ORDER — MAGNESIUM HYDROXIDE/ALUMINUM HYDROXICE/SIMETHICONE 120; 1200; 1200 MG/30ML; MG/30ML; MG/30ML
30 SUSPENSION ORAL EVERY 6 HOURS PRN
COMMUNITY

## 2020-06-05 RX ORDER — MEMANTINE HYDROCHLORIDE 10 MG/1
10 TABLET ORAL 2 TIMES DAILY
Status: DISCONTINUED | OUTPATIENT
Start: 2020-06-05 | End: 2020-06-08 | Stop reason: HOSPADM

## 2020-06-05 RX ORDER — CLOPIDOGREL BISULFATE 75 MG/1
75 TABLET ORAL DAILY
Status: DISCONTINUED | OUTPATIENT
Start: 2020-06-05 | End: 2020-06-08 | Stop reason: HOSPADM

## 2020-06-05 RX ORDER — POTASSIUM CHLORIDE 20 MEQ/1
20 TABLET, EXTENDED RELEASE ORAL DAILY
Status: DISCONTINUED | OUTPATIENT
Start: 2020-06-05 | End: 2020-06-08 | Stop reason: HOSPADM

## 2020-06-05 RX ORDER — ESCITALOPRAM OXALATE 5 MG/1
5 TABLET ORAL DAILY
COMMUNITY

## 2020-06-05 RX ORDER — ASPIRIN 81 MG/1
81 TABLET ORAL DAILY
COMMUNITY

## 2020-06-05 RX ORDER — DIVALPROEX SODIUM 125 MG/1
500 TABLET, DELAYED RELEASE ORAL 2 TIMES DAILY
Status: DISCONTINUED | OUTPATIENT
Start: 2020-06-05 | End: 2020-06-08 | Stop reason: HOSPADM

## 2020-06-05 RX ORDER — DONEPEZIL HYDROCHLORIDE 23 MG/1
23 TABLET, FILM COATED ORAL NIGHTLY
COMMUNITY

## 2020-06-05 RX ORDER — FUROSEMIDE 40 MG/1
40 TABLET ORAL DAILY
Status: DISCONTINUED | OUTPATIENT
Start: 2020-06-05 | End: 2020-06-08 | Stop reason: HOSPADM

## 2020-06-05 RX ORDER — ISOSORBIDE MONONITRATE 30 MG/1
30 TABLET, EXTENDED RELEASE ORAL DAILY
Status: DISCONTINUED | OUTPATIENT
Start: 2020-06-05 | End: 2020-06-08 | Stop reason: HOSPADM

## 2020-06-05 RX ORDER — ONDANSETRON 2 MG/ML
4 INJECTION INTRAMUSCULAR; INTRAVENOUS EVERY 6 HOURS PRN
Status: DISCONTINUED | OUTPATIENT
Start: 2020-06-05 | End: 2020-06-08 | Stop reason: HOSPADM

## 2020-06-05 RX ORDER — DIVALPROEX SODIUM 500 MG/1
500 TABLET, DELAYED RELEASE ORAL 2 TIMES DAILY
COMMUNITY

## 2020-06-05 RX ADMIN — ALBUTEROL SULFATE 2.5 MG: 2.5 SOLUTION RESPIRATORY (INHALATION) at 06:30

## 2020-06-05 RX ADMIN — Medication 10 ML: at 20:54

## 2020-06-05 RX ADMIN — LORAZEPAM 0.5 MG: 0.5 TABLET ORAL at 20:53

## 2020-06-05 RX ADMIN — LORAZEPAM 0.5 MG: 0.5 TABLET ORAL at 16:41

## 2020-06-05 RX ADMIN — GUAIFENESIN 1200 MG: 600 TABLET, EXTENDED RELEASE ORAL at 11:00

## 2020-06-05 RX ADMIN — TETANUS TOXOID, REDUCED DIPHTHERIA TOXOID AND ACELLULAR PERTUSSIS VACCINE, ADSORBED 0.5 ML: 5; 2.5; 8; 8; 2.5 SUSPENSION INTRAMUSCULAR at 00:45

## 2020-06-05 RX ADMIN — CLOPIDOGREL BISULFATE 75 MG: 75 TABLET ORAL at 11:01

## 2020-06-05 RX ADMIN — ALBUTEROL SULFATE 2.5 MG: 2.5 SOLUTION RESPIRATORY (INHALATION) at 23:03

## 2020-06-05 RX ADMIN — DIVALPROEX SODIUM 500 MG: 500 TABLET, DELAYED RELEASE ORAL at 20:52

## 2020-06-05 RX ADMIN — ASPIRIN 81 MG: 81 TABLET, COATED ORAL at 11:01

## 2020-06-05 RX ADMIN — DIGOXIN 250 MCG: 250 TABLET ORAL at 11:01

## 2020-06-05 RX ADMIN — MEMANTINE 10 MG: 10 TABLET ORAL at 11:01

## 2020-06-05 RX ADMIN — ATORVASTATIN CALCIUM 20 MG: 20 TABLET, FILM COATED ORAL at 20:53

## 2020-06-05 RX ADMIN — ALBUTEROL SULFATE 2.5 MG: 2.5 SOLUTION RESPIRATORY (INHALATION) at 18:34

## 2020-06-05 RX ADMIN — ALBUTEROL SULFATE 2.5 MG: 2.5 SOLUTION RESPIRATORY (INHALATION) at 11:30

## 2020-06-05 RX ADMIN — MEMANTINE 10 MG: 10 TABLET ORAL at 20:53

## 2020-06-05 RX ADMIN — LORAZEPAM 0.5 MG: 0.5 TABLET ORAL at 11:00

## 2020-06-05 RX ADMIN — ISOSORBIDE MONONITRATE 30 MG: 30 TABLET, EXTENDED RELEASE ORAL at 11:00

## 2020-06-05 RX ADMIN — FUROSEMIDE 40 MG: 40 TABLET ORAL at 11:01

## 2020-06-05 RX ADMIN — DONEPEZIL HYDROCHLORIDE 20 MG: 5 TABLET, FILM COATED ORAL at 21:01

## 2020-06-05 RX ADMIN — ESCITALOPRAM OXALATE 5 MG: 10 TABLET ORAL at 11:02

## 2020-06-05 RX ADMIN — GUAIFENESIN 1200 MG: 600 TABLET, EXTENDED RELEASE ORAL at 20:52

## 2020-06-05 RX ADMIN — COLLAGENASE SANTYL: 250 OINTMENT TOPICAL at 13:20

## 2020-06-05 RX ADMIN — DIVALPROEX SODIUM 500 MG: 500 TABLET, DELAYED RELEASE ORAL at 11:00

## 2020-06-05 RX ADMIN — Medication 10 ML: at 11:02

## 2020-06-05 RX ADMIN — POTASSIUM CHLORIDE 20 MEQ: 1500 TABLET, EXTENDED RELEASE ORAL at 11:00

## 2020-06-05 NOTE — PLAN OF CARE
Problem: Patient Care Overview  Goal: Plan of Care Review  Outcome: Ongoing (interventions implemented as appropriate)  Flowsheets (Taken 6/5/2020 3229)  Progress: no change  Plan of Care Reviewed With: patient  Outcome Summary: Patient resting in bed with sitter at bedside. He is a falls risk with his bed alarm on. Patient has mulitple abrasions and a wound on his head along with an unstagable pressure injury on his coccyx. All wounds were cleaned and new mepliex were applied. Will continue to monitor patient.

## 2020-06-05 NOTE — H&P
[facility] Medicine Services      Patient Name: Nathalia Carmona  : 3/3/1933  MRN: [id number]  Primary Care Physician: Rolan Amin MD  Date of admission: 2020    Patient Care Team:  Rolan Amin MD as PCP - General (Internal Medicine)          Subjective   History Present Illness     Chief Complaint:   Chief Complaint   Patient presents with   • Head Injury       Mr. Carmona is a 87 y.o. male who presents to [facility] ED with a history of atrial fibrillation, COPD, hypertension, hyperlipidemia, diabetes mellitus type 2 complaining of fall at nursing home.         Mr. Carmona is a 87 y.o. male who presents to [facility] ED with a history of atrial fibrillation, COPD, hypertension, hyperlipidemia, diabetes mellitus type 2 complaining of fall at nursing home. Patient states he hurts all over but unable to qualify or quantify. Patient is a poor historian due to advanced dementia. HPI from ER report.  Fall at nursing home.  EMS states patient was alert and oriented x3 for them.    In the ED, troponin negative, BNP one 296.0, WBC 6.90, hemoglobin 9.4, INR 1.12, PTT 24.1, glucose 105.  EKG shows sinus rhythm with a rate of 82.  CT C-spine shows no acute osseous abnormality, reverse normal lordotic curvature, osteopenia, multilevel degenerative changes and a small to moderate left pleural effusion with atelectasis.  CT head shows no acute intracranial process, frontal scalp soft tissue swelling hematoma with mild changes small vessel ischemic disease of indeterminate age presumably mostly chronic with volume loss and atherosclerosis and left mastoid effusion with paraspinal sinus disease.  Patient admitted for further evaluation and treatment.      Review of Systems   Unable to perform ROS: dementia         Personal History     Past Medical History:   Past Medical History:   Diagnosis Date   • Anemia    • Atrial fibrillation (CMS/H[facility])    • COPD (chronic  obstructive pulmonary disease) (CMS/[facility])    • Dementia (CMS/[facility])    • Depression    • Diabetes mellitus (CMS/[facility])    • Hyperlipidemia    • Hypertension    • Lung cancer (CMS/[facility])    • Thrombocytopenia (CMS/[facility])        Surgical History:      Past Surgical History:   Procedure Laterality Date   • OTHER SURGICAL HISTORY      Loop Recorder        Family History: family history includes No Known Problems in his father and mother. Otherwise pertinent FHx was reviewed and unremarkable.     Social History:  reports that he has never smoked. He does not have any smokeless tobacco history on file. He reports that he does not drink alcohol or use drugs.      Medications:  Prior to Admission medications    Medication Sig Start Date End Date Taking? Authorizing Provider   acetaminophen (TYLENOL) 325 MG tablet Take 650 mg by mouth Every 6 (Six) Hours As Needed for Mild Pain  or Fever.   Yes Rafael Juarez MD   albuterol sulfate  (90 Base) MCG/ACT inhaler Inhale 1 puff 2 (Two) Times a Day.   Yes Rafael Juarez MD   albuterol sulfate  (90 Base) MCG/ACT inhaler Inhale 1 puff Every 4 (Four) Hours As Needed for Wheezing.   Yes Rafael Juarez MD   aluminum-magnesium hydroxide-simethicone (MAALOX/MYLANTA) 200-200-20 MG/5ML suspension Take 30 mL by mouth Every 6 (Six) Hours As Needed for Indigestion or Heartburn.   Yes Rafael Juarez MD   aspirin 81 MG EC tablet Take 81 mg by mouth Daily.   Yes Rafael Juarez MD   atorvastatin (LIPITOR) 20 MG tablet Take 20 mg by mouth Every Night.   Yes Rafael Juarez MD   clopidogrel (PLAVIX) 75 MG tablet Take 75 mg by mouth Daily.   Yes Rafael Juarez MD   digoxin (LANOXIN) 250 MCG tablet Take 250 mcg by mouth Daily.   Yes Rafael Juarez MD   divalproex (DEPAKOTE) 500 MG DR tablet Take 500 mg by mouth 2 (Two) Times a Day.   Yes Rafael Juarez MD   donepezil (ARICEPT) 23 MG tablet Take 23 mg by mouth Every Night.   Yes  Rafael Juarez MD   escitalopram (LEXAPRO) 5 MG tablet Take 5 mg by mouth Daily.   Yes Rafael Juarez MD   furosemide (LASIX) 40 MG tablet Take 40 mg by mouth Daily.   Yes Rafael Juarez MD   guaiFENesin (MUCINEX) 600 MG 12 hr tablet Take 1,200 mg by mouth 2 (Two) Times a Day.   Yes Rafael Juarez MD   isosorbide mononitrate (IMDUR) 30 MG 24 hr tablet Take 30 mg by mouth Daily.   Yes Rafael Juarez MD   LORazepam (ATIVAN) 0.5 MG tablet Take 0.5 mg by mouth 3 (Three) Times a Day.   Yes Rafael Juarez MD   magnesium hydroxide (MILK OF MAGNESIA) 400 MG/5ML suspension Take 30 mL by mouth Daily As Needed for Constipation.   Yes Rafael Juarez MD   memantine (NAMENDA) 10 MG tablet Take 10 mg by mouth 2 (Two) Times a Day.   Yes Rafael Juarez MD   morphine sulfate, concentrate, 10 MG/0.5ML solution oral solution Take 5 mg by mouth Every 4 (Four) Hours As Needed.   Yes Rafael Juarez MD   potassium chloride (K-DUR,KLOR-CON) 20 MEQ CR tablet Take 20 mEq by mouth Daily.   Yes Rafael Juarez MD   Wound Dressings ([facility] WOUND/BURN DRESSING) gel Apply  topically Daily. Apply to coccyx and secure with boarder gauze   Yes Rafael Juarez MD   albuterol sulfate  (90 Base) MCG/ACT inhaler Inhale 1 puff 2 (Two) Times a Day.    Rafael Juarez MD       Allergies:    Allergies   Allergen Reactions   • Ibuprofen Unknown (See Comments)     Unknown     • Nsaids Unknown (See Comments)     unknown       Objective   Objective     Vital Signs  Temp:  [97.4 °F (36.3 °C)-98 °F (36.7 °C)] 97.4 °F (36.3 °C)  Heart Rate:  [78-83] 80  Resp:  [17-18] 17  BP: (119-135)/(65-74) 119/70  SpO2:  [93 %-96 %] 93 %  on   ;   Device (Oxygen Therapy): room air  Body mass index is 29.6 kg/m².    Physical Exam   Constitutional: He appears well-developed. He appears distressed.   HENT:   Head: Normocephalic and atraumatic.       Mouth/Throat: Oropharynx is clear and  moist.   Eyes: Pupils are equal, round, and reactive to light. Conjunctivae and EOM are normal.   Neck: Normal range of motion.   Cardiovascular: Normal rate, regular rhythm, normal heart sounds and intact distal pulses.   Pulmonary/Chest: Effort normal and breath sounds normal. No respiratory distress.   Abdominal: Soft. Bowel sounds are normal. He exhibits no distension. There is no tenderness.   Musculoskeletal: Normal range of motion. He exhibits no edema.   Neurological: He is alert.   Patient oriented to self only   Skin: Skin is warm and dry. Capillary refill takes less than 2 seconds.   Vitals reviewed.      Results Review:  I have personally reviewed most recent cardiac tracings, lab results and radiology images and interpretations and agree with findings.    Results from last 7 days   Lab Units 06/05/20 0044 06/05/20 0043   WBC 10*3/mm3  --  6.90   HEMOGLOBIN g/dL  --  9.4*   HEMATOCRIT %  --  29.5*   PLATELETS 10*3/mm3  --  89*   INR  1.12*  --      Results from last 7 days   Lab Units 06/05/20 0043   SODIUM mmol/L 139   POTASSIUM mmol/L 3.8   CHLORIDE mmol/L 104   CO2 mmol/L 29.0   BUN  12   CREATININE mg/dL 0.64*   GLUCOSE mg/dL 105*   CALCIUM mg/dL 8.3*   ALT (SGPT) U/L 15   AST (SGOT) U/L 17   TROPONIN T ng/mL <0.010   PROBNP pg/mL 1,296.0     Estimated Creatinine Clearance: 55 mL/min (A) (by C-G formula based on SCr of 0.64 mg/dL (L)).  Brief Urine Lab Results     None          Microbiology Results (last 10 days)     ** No results found for the last 240 hours. **          ECG/EMG Results (most recent)     Procedure Component Value Units Date/Time    ECG 12 Lead [[id number]] Collected:  06/05/20 0040     Updated:  06/05/20 0043    Narrative:       HEART RATE= 82  bpm  RR Interval= 728  ms  OR Interval= 210  ms  P Horizontal Axis=   deg  P Front Axis= 79  deg  QRSD Interval= 88  ms  QT Interval= 344  ms  QRS Axis= 62  deg  T Wave Axis= 74  deg  - BORDERLINE ECG -  Sinus rhythm  Low voltage with right  axis deviation  Electronically Signed By:   Date and Time of Study: 2020-06-05 00:40:51              Ct Head Without Contrast    Result Date: 6/4/2020  1. No acute intracranial process. Frontal scalp soft tissue swelling/hematoma. 2. Mild changes small vessel ischemic disease of indeterminate age, presumably mostly chronic. Volume loss. Atherosclerosis. 3. Left mastoid effusion. Paranasal sinus disease.  Electronically signed by:  Jairon Sykes M.D.  6/4/2020 11:32 PM    Ct Cervical Spine Without Contrast    Result Date: 6/4/2020  1. No acute osseous abnormality. Reversal normal lordotic curvature. Osteopenia. Multilevel degenerative changes. 2. Small to moderate left pleural effusion with atelectasis.  Electronically signed by:  Jairon Sykes M.D.  6/4/2020 11:38 PM        Estimated Creatinine Clearance: 55 mL/min (A) (by C-G formula based on SCr of 0.64 mg/dL (L)).    Assessment/Plan   Assessment/Plan       Active Hospital Problems    Diagnosis  POA   • **Syncope and collapse [R55]  Yes     Priority: High   • Elevated random blood glucose level [R73.09]  Yes     Priority: Low   • Hyperlipidemia [E78.5]  Yes   • Seizure disorder (CMS/HCC) [G40.909]  Yes   • Paroxysmal atrial fibrillation (CMS/HCC) [I48.0]  Yes   • Hypertension [I10]  Yes   • Gastroesophageal reflux disease [K21.9]  Yes   • Dementia (CMS/HCC) [F03.90]  Yes   • Chronic obstructive lung disease (CMS/HCC) [J44.9]  Yes   • Anemia [D64.9]  Yes      Resolved Hospital Problems   No resolved problems to display.     Syncope  -EKG reviewed  -CT reviewed  -Orthostatics  -Consider Carotid US  -Echo ordered, pending    Elevated blood glucose level  -Glucose 105  -A1C in a.m.  -Repeat BMP in am    COPD, not in exacerbation  -Continue albuterol    Essential Hypertension, Chronic, Controlled   -Continue home Lasix  - Monitor with routine vital signs     CAD  -Continue Imdur    A. fib, chronic  - Continue Plavix, ASA, digoxin    Anemia,  chronic  -repeat CBC in am    Seizure disorder  -Continue Depakote  -Valproate level    Hyperlipidemia  - Continue statin    GERD  -Continue PPI           Dementia with depression  - Continue Aricept, Lexapro, Namenda    Dietary and other nursing home supplementation  -Continue supplements for now  - Unable to verify inspect for nursing home patient        VTE Prophylaxis -   Mechanical Order History:      Ordered        06/05/20 0423  Place Sequential Compression Device  Once         06/05/20 0423  Maintain Sequential Compression Device  Continuous                 Pharmalogical Order History:     None          CODE STATUS:    Code Status and Medical Interventions:   Ordered at: 06/05/20 0423     Code Status:    CPR     Medical Interventions (Level of Support Prior to Arrest):    Full       This patient has been examined wearing appropriate Personal Protective Equipment. 06/05/20      I discussed the patient's findings and my recommendations with patient and nursing staff.        Electronically signed by ROSA ELENA Orellana, 06/05/20, 6:08 AM.  [facility] Hospitalist Team

## 2020-06-05 NOTE — NURSING NOTE
Pt seen today for unstageable pressure injury to sacral/coccyx area.     Pt has some incontinence of stool reported. Recommend consider controlling incontinent of urine with male incontinence wraps. This will help keep wound/ dressing dry.     Sacral/coccyx unstageable pressure injury. Wound measures 2x0.3x0.2cm wound bed with 100% thin yellow slough. periwound clear/intact. Scant serosang drainage noted. Silicone foam dressing was in place and reapplied.    Skin tear to left elbow also noted to be approx 4x4x0.1cm wound bed pink/moist. Not currently bleeding.  Silicone foam dressing placed. Rec. Change q 3 days and prn.    Recommend continue prevention strategies such as immersion bed, ultrasorb pads, turn q 2 side to side 30 degrees from supine. Waffle cushion when oob etc.    Recommend santyl to coccyx wound daily cover with silicone foam dressing.

## 2020-06-05 NOTE — ED PROVIDER NOTES
Subjective   87-year-old male with fall at nursing home.  Details are not completely clear as patient has advanced dementia and tells me he does not remember anything.  Medic reported patient told him that he passed out and then fell.  Medic reported patient was alert and oriented x3 for him but for me patient is only oriented to person.  Patient states he hurts all over.  Patient was reportedly 90% on room air at nursing home.          Review of Systems   Unable to perform ROS: Dementia       Past Medical History:   Diagnosis Date   • Anemia    • Atrial fibrillation (CMS/HCC)    • COPD (chronic obstructive pulmonary disease) (CMS/HCC)    • Dementia (CMS/HCC)    • Depression    • Diabetes mellitus (CMS/HCC)    • Hyperlipidemia    • Hypertension    • Lung cancer (CMS/HCC)    • Thrombocytopenia (CMS/HCC)        Allergies   Allergen Reactions   • Ibuprofen Unknown (See Comments)     Unknown     • Nsaids Unknown (See Comments)     unknown       Past Surgical History:   Procedure Laterality Date   • OTHER SURGICAL HISTORY      Loop Recorder        No family history on file.    Social History     Socioeconomic History   • Marital status:      Spouse name: Not on file   • Number of children: Not on file   • Years of education: Not on file   • Highest education level: Not on file   Substance and Sexual Activity   • Alcohol use: No     Frequency: Never     Comment: previous            Objective   Physical Exam   Constitutional: He appears well-developed and well-nourished.   HENT:   Mouth/Throat: Oropharynx is clear and moist.   Forehead laceration   Eyes: Pupils are equal, round, and reactive to light. Conjunctivae and EOM are normal.   Neck: Normal range of motion. Neck supple.   Cardiovascular: Normal rate, regular rhythm, normal heart sounds and intact distal pulses.   Pulmonary/Chest: Effort normal and breath sounds normal.   Abdominal: Soft. Bowel sounds are normal. He exhibits no distension. There is no  tenderness.   Musculoskeletal:   Skin tears bilateral upper extremities, no bony tenderness to palpation, full range of motion bilateral lower extremities without pain or tenderness, thoracic lumbar spines nontender   Neurological: He is alert. No cranial nerve deficit.   Oriented to person only, strength and sensation intact   Skin: Skin is warm and dry. Capillary refill takes less than 2 seconds.   Psychiatric: He has a normal mood and affect. His behavior is normal.       Laceration Repair  Date/Time: 6/5/2020 2:08 AM  Performed by: Mike Bravo MD  Authorized by: Mike Bravo MD     Consent:     Consent obtained:  Verbal    Consent given by:  Patient  Laceration details:     Location:  Scalp    Scalp location:  Frontal    Length (cm):  1  Repair type:     Repair type:  Simple  Pre-procedure details:     Preparation:  Patient was prepped and draped in usual sterile fashion  Exploration:     Wound exploration: wound explored through full range of motion      Contaminated: no    Treatment:     Area cleansed with:  Hibiclens    Amount of cleaning:  Standard    Irrigation solution:  Sterile saline  Skin repair:     Repair method:  Sutures    Suture size:  5-0    Suture material:  Nylon    Suture technique:  Simple interrupted    Number of sutures:  3  Approximation:     Approximation:  Close  Post-procedure details:     Dressing:  Antibiotic ointment    Patient tolerance of procedure:  Tolerated well, no immediate complications               ED Course  ED Course as of Jun 05 0240   Fri Jun 05, 2020   0044 EKG interpretation: Normal sinus rhythm, rate 82, no ST elevation    [JR]      ED Course User Index  [JR] Mike Bravo MD                                           [facility]  Number of Diagnoses or Management Options  Laceration of scalp, initial encounter:   Multiple skin tears:   Pleural effusion:   Syncope and collapse:   Diagnosis management comments: Results for orders placed or performed during the hospital  encounter of 06/05/20  -Comprehensive Metabolic Panel       Result                      Value             Ref Range           Glucose                     105 (H)           65 - 99 mg/dL       BUN                                                               Creatinine                  0.64 (L)          0.76 - 1.27 *       Sodium                      139               136 - 145 mm*       Potassium                   3.8               3.5 - 5.2 mm*       Chloride                    104               98 - 107 mmo*       CO2                         29.0              22.0 - 29.0 *       Calcium                     8.3 (L)           8.6 - 10.5 m*       Total Protein               5.5 (L)           6.0 - 8.5 g/*       Albumin                     3.10 (L)          3.50 - 5.20 *       ALT (SGPT)                  15                1 - 41 U/L          AST (SGOT)                  17                1 - 40 U/L          Alkaline Phosphatase        79                39 - 117 U/L        Total Bilirubin             0.3               0.2 - 1.2 mg*       eGFR Non African Amer       118               >60 mL/min/1*       Globulin                    2.4               gm/dL               A/G Ratio                   1.3               g/dL                BUN/Creatinine Ratio                                              Anion Gap                   6.0               5.0 - 15.0 m*  -Protime-INR       Result                      Value             Ref Range           Protime                     11.5              9.6 - 11.7 S*       INR                         1.12 (H)          0.90 - 1.10    -aPTT       Result                      Value             Ref Range           PTT                         24.1              24.0 - 31.0 *  -Troponin       Result                      Value             Ref Range           Troponin T                  <0.010            0.000 - 0.03*  -BNP       Result                      Value             Ref Range            proBNP                      1,296.0           5.0-1,800.0 *  -CBC Auto Differential       Result                      Value             Ref Range           WBC                         6.90              3.40 - 10.80*       RBC                         3.10 (L)          4.14 - 5.80 *       Hemoglobin                  9.4 (L)           13.0 - 17.7 *       Hematocrit                  29.5 (L)          37.5 - 51.0 %       MCV                         95.1              79.0 - 97.0 *       MCH                         30.3              26.6 - 33.0 *       MCHC                        31.9              31.5 - 35.7 *       RDW                         18.2 (H)          12.3 - 15.4 %       RDW-SD                      61.3 (H)          37.0 - 54.0 *       MPV                         10.0              6.0 - 12.0 fL       Platelets                   89 (L)            140 - 450 10*  -Valproic Acid Level, Total       Result                      Value             Ref Range           Valproic Acid               57.8              50.0 - 125.0*  -Scan Slide       Result                      Value             Ref Range           Scan Slide                                                   -BUN       Result                      Value             Ref Range           BUN                         12                8 - 23 mg/dL   -Manual Differential       Result                      Value             Ref Range           Neutrophil %                53.0              42.7 - 76.0 %       Lymphocyte %                19.0 (L)          19.6 - 45.3 %       Monocyte %                  24.0 (H)          5.0 - 12.0 %        Bands %                     4.0               0.0 - 5.0 %         Neutrophils Absolute        3.93              1.70 - 7.00 *       Lymphocytes Absolute        1.31              0.70 - 3.10 *       Monocytes Absolute          1.66 (H)          0.10 - 0.90 *       Acanthocytes                Slight/1+         None Seen            Anisocytosis                Slight/1+         None Seen           Poikilocytes                Slight/1+         None Seen           Polychromasia               Slight/1+         None Seen           WBC Morphology              Normal            Normal              Platelet Estimate           Decreased         Normal         -Gold Top - SST       Result                      Value             Ref Range           Extra Tube                                                    Hold for add-ons.  Ct Head Without Contrast    Result Date: 6/4/2020  1. No acute intracranial process. Frontal scalp soft tissue swelling/hematoma. 2. Mild changes small vessel ischemic disease of indeterminate age, presumably mostly chronic. Volume loss. Atherosclerosis. 3. Left mastoid effusion. Paranasal sinus disease.  Electronically signed by:  Jairon Sykes M.D.  6/4/2020 11:32 PM    Ct Cervical Spine Without Contrast    Result Date: 6/4/2020  1. No acute osseous abnormality. Reversal normal lordotic curvature. Osteopenia. Multilevel degenerative changes. 2. Small to moderate left pleural effusion with atelectasis.  Electronically signed by:  Jairon Sykes M.D.  6/4/2020 11:38 PM    Patient now confused with and verbalized he passed out and injured his head.  Patient has a loop recorder on chest x-ray which could be interrogated in the morning.  Patient is a full code per paperwork.  Significance of left pleural effusion is not clear at this time, no respiratory distress in emergency department.  Regarding left mastoid effusion, nontender, no edema or erythema on exam.  Left tympanic membrane normal.       Amount and/or Complexity of Data Reviewed  Clinical lab tests: reviewed  Tests in the radiology section of CPT®: reviewed  Tests in the medicine section of CPT®: reviewed  Decide to obtain previous medical records or to obtain history from someone other than the patient: yes        Final diagnoses:   Syncope and collapse    Laceration of scalp, initial encounter   Multiple skin tears   Pleural effusion            Mike Bravo MD  06/05/20 1307

## 2020-06-05 NOTE — DISCHARGE PLACEMENT REQUEST
"Nathalia Carmona (87 y.o. Male)     Date of Birth Social Security Number Address Home Phone MRN    03/03/1933  [address] [facility] [address]  [place] [address] [phone] [id number]    Sikhism Marital Status          Confucianism        Admission Date Admission Type Admitting Provider Attending Provider Department, Room/Bed    6/5/20 Emergency Addie Jarquin MD Lackey, Diana, MD [facility] 2C MEDICAL INPATIENT, 247/1    Discharge Date Discharge Disposition Discharge Destination                       Attending Provider:  Addie Jarquin MD    Allergies:  Ibuprofen, Nsaids    Isolation:  None   Infection:  None   Code Status:  CPR    Ht:  154.9 cm (60.98\")   Wt:  70.8 kg (156 lb)    Admission Cmt:  None   Principal Problem:  Syncope and collapse [R55]                 Active Insurance as of 6/5/2020     Primary Coverage     Payor Plan Insurance Group Employer/Plan Group    MEDICARE MEDICARE A & B      Payor Plan Address Payor Plan Phone Number Payor Plan Fax Number Effective Dates    [address] [phone]  3/1/1998 - None Entered    [facility] [address]       Subscriber Name Subscriber Birth Date Member ID       NATHALIA CARMONA 3/3/1933 [id number]           Secondary Coverage     Payor Plan Insurance Group Employer/Plan Group     FOR LIFE  FOR LIFE  SUP       Payor Plan Address Payor Plan Phone Number Payor Plan Fax Number Effective Dates    [address] [phone]  11/20/2019 - None Entered    [facility] [address]       Subscriber Name Subscriber Birth Date Member ID       NATHALIA CARMONA 3/3/1933 [id number]           Tertiary Coverage     Payor Plan Insurance Group Employer/Plan Group    [place]      Payor Plan Address Payor Plan Phone Number Payor Plan Fax Number Effective Dates    [address]   6/1/2020 - None Entered    [place] [address]       Subscriber Name Subscriber Birth Date Member ID       NATHALIA CARMONA 3/3/1933 " [id number]                 Emergency Contacts      (Rel.) Home Phone Work Phone Mobile Phone    GRICEL ARTHUR (DAUGHTER) (Power of ) [phone] -- --

## 2020-06-05 NOTE — PLAN OF CARE
Problem: Patient Care Overview  Goal: Plan of Care Review  Outcome: Ongoing (interventions implemented as appropriate)  Flowsheets (Taken 6/5/2020 1217)  Plan of Care Reviewed With: patient  Outcome Summary: patient is doing well today but remains impulsive and is requiring a sitter at bedside. vital signs are stable. no complaints or concerns. wound care nurse to see patient for pressure injury and skin tears. specialty bed ordered. report given to dionna, she will be taking over care. will continue to  monitor  Goal: Individualization and Mutuality  Outcome: Ongoing (interventions implemented as appropriate)  Goal: Discharge Needs Assessment  Outcome: Ongoing (interventions implemented as appropriate)  Goal: Interprofessional Rounds/Family Conf  Outcome: Ongoing (interventions implemented as appropriate)     Problem: Fall Risk (Adult)  Goal: Identify Related Risk Factors and Signs and Symptoms  Outcome: Ongoing (interventions implemented as appropriate)  Goal: Absence of Fall  Outcome: Ongoing (interventions implemented as appropriate)     Problem: Skin Injury Risk (Adult)  Goal: Identify Related Risk Factors and Signs and Symptoms  Outcome: Ongoing (interventions implemented as appropriate)  Goal: Skin Health and Integrity  Outcome: Ongoing (interventions implemented as appropriate)

## 2020-06-05 NOTE — PROGRESS NOTES
Discharge Planning Assessment   Nagi     Patient Name: Nathalia Carmona  MRN: [id number]  Today's Date: 6/5/2020    Admit Date: 6/5/2020    Discharge Needs Assessment     Row Name 06/05/20 1032       Living Environment    Lives With  facility resident    Current Living Arrangements  home/apartment/condo    Primary Care Provided by  other (see comments) [facility] staff    Provides Primary Care For  no one, unable/limited ability to care for self    Able to Return to Prior Arrangements  yes       Resource/Environmental Concerns    Resource/Environmental Concerns  none    Transportation Concerns  car, none       Transition Planning    Patient/Family Anticipates Transition to  long term care facility    Patient/Family Anticipated Services at Transition  hospice care    Transportation Anticipated  health plan transportation       Discharge Needs Assessment    Readmission Within the Last 30 Days  no previous admission in last 30 days    Concerns to be Addressed  discharge planning    Equipment Currently Used at Home  other (see comments) ECF resident    Anticipated Changes Related to Illness  inability to care for self    Current Discharge Risk  chronically ill        Discharge Plan     Row Name 06/05/20 1033       Plan    Plan  From [facility] resident with [facility]. No PASRR or precert required. Need to confirm plan with POA.     Plan Comments  Attempted to call into room, no answer. Attempted to call POA multiple times, line was busy. Per Nayeli facility liasion patient is LTC resident at [facility] and can return. Per Kaylan liasion at [facility], he is their current patient and will resume hospice services after discharge. DC barriers: ECHO pending         Destination - Selection Complete      Service Provider Request Status Selected Services Address Phone Number Fax Number    [facility] [place] [address] [phone]          Home Medical Care      Service Provider Request Status Selected Services Address Phone Number Fax Number    [facility] [address] [facility] [place] [address] [phone] --     Demographic Summary     Row Name 06/05/20 1029       General Information    Admission Type  observation    Arrived From  emergency department    Required Notices Provided  Observation Status Notice    Referral Source  admission list    Reason for Consult  discharge planning    Preferred Language  English     Used During This Interaction  no        Functional Status     Row Name 06/05/20 1032       Functional Status    Usual Activity Tolerance  fair    Current Activity Tolerance  poor       Functional Status, IADL    Medications  assistive person    Meal Preparation  assistive person    Housekeeping  assistive person    Laundry  assistive person    Shopping  assistive person        Patient Forms     Row Name 06/05/20 1046       Patient Forms    Important Message from Medicare (IMM)  -- Rodriguez 6/5            Stephani Bryan RN

## 2020-06-05 NOTE — DISCHARGE PLACEMENT REQUEST
"MathewNathalia chaparro (87 y.o. Male)     Date of Birth Social Security Number Address Home Phone MRN    03/03/1933  [address] [facility] [address]  [place] [address] [phone] [id number]    Mosque Marital Status          Presybeterian        Admission Date Admission Type Admitting Provider Attending Provider Department, Room/Bed    6/5/20 Emergency Addie Jarquin MD Lackey, Diana, MD [facility] 2C MEDICAL INPATIENT, 247/1    Discharge Date Discharge Disposition Discharge Destination                       Attending Provider:  Addie Jarquin MD    Allergies:  Ibuprofen, Nsaids    Isolation:  None   Infection:  None   Code Status:  CPR    Ht:  154.9 cm (60.98\")   Wt:  71 kg (156 lb 9.6 oz)    Admission Cmt:  None   Principal Problem:  Syncope and collapse [R55]                 Active Insurance as of 6/5/2020     Primary Coverage     Payor Plan Insurance Group Employer/Plan Group    MEDICARE MEDICARE A & B      Payor Plan Address Payor Plan Phone Number Payor Plan Fax Number Effective Dates    [address] [phone]  3/1/1998 - None Entered    [facility] [address]       Subscriber Name Subscriber Birth Date Member ID       NATHALIA STEVENS 3/3/1933 [id number]           Secondary Coverage     Payor Plan Insurance Group Employer/Plan Group     FOR LIFE  FOR LIFE MC SUP       Payor Plan Address Payor Plan Phone Number Payor Plan Fax Number Effective Dates    [address] [phone]  11/20/2019 - None Entered    [facility] [address]       Subscriber Name Subscriber Birth Date Member ID       MATHEWNATHALIA CHAPARRO 3/3/1933 [id number]           Tertiary Coverage     Payor Plan Insurance Group Employer/Plan Group    [place]      Payor Plan Address Payor Plan Phone Number Payor Plan Fax Number Effective Dates    [address]   6/1/2020 - None Entered    [place] [address]       Subscriber Name Subscriber Birth Date Member ID       MATHWEJOHNSON " 3/3/1933 [id number]                 Emergency Contacts      (Rel.) Home Phone Work Phone Mobile Phone    GRICEL ARTHUR (Power of ) [phone] -- --

## 2020-06-06 LAB
ANION GAP SERPL CALCULATED.3IONS-SCNC: 8 MMOL/L (ref 5–15)
BASOPHILS # BLD AUTO: 0 10*3/MM3 (ref 0–0.2)
BASOPHILS NFR BLD AUTO: 0.7 % (ref 0–1.5)
BUN BLD-MCNC: 9 MG/DL (ref 8–23)
BUN BLD-MCNC: ABNORMAL MG/DL
BUN/CREAT SERPL: ABNORMAL
CALCIUM SPEC-SCNC: 8.7 MG/DL (ref 8.6–10.5)
CHLORIDE SERPL-SCNC: 103 MMOL/L (ref 98–107)
CO2 SERPL-SCNC: 26 MMOL/L (ref 22–29)
CREAT BLD-MCNC: 0.64 MG/DL (ref 0.76–1.27)
DEPRECATED RDW RBC AUTO: 63.4 FL (ref 37–54)
EOSINOPHIL # BLD AUTO: 0 10*3/MM3 (ref 0–0.4)
EOSINOPHIL NFR BLD AUTO: 0.2 % (ref 0.3–6.2)
ERYTHROCYTE [DISTWIDTH] IN BLOOD BY AUTOMATED COUNT: 18.6 % (ref 12.3–15.4)
GFR SERPL CREATININE-BSD FRML MDRD: 118 ML/MIN/1.73
GLUCOSE BLD-MCNC: 112 MG/DL (ref 65–99)
HCT VFR BLD AUTO: 30.6 % (ref 37.5–51)
HGB BLD-MCNC: 9.9 G/DL (ref 13–17.7)
LYMPHOCYTES # BLD AUTO: 1.2 10*3/MM3 (ref 0.7–3.1)
LYMPHOCYTES NFR BLD AUTO: 18 % (ref 19.6–45.3)
MCH RBC QN AUTO: 31 PG (ref 26.6–33)
MCHC RBC AUTO-ENTMCNC: 32.4 G/DL (ref 31.5–35.7)
MCV RBC AUTO: 95.7 FL (ref 79–97)
MONOCYTES # BLD AUTO: 1.4 10*3/MM3 (ref 0.1–0.9)
MONOCYTES NFR BLD AUTO: 20.6 % (ref 5–12)
NEUTROPHILS # BLD AUTO: 4 10*3/MM3 (ref 1.7–7)
NEUTROPHILS NFR BLD AUTO: 60.5 % (ref 42.7–76)
NRBC BLD AUTO-RTO: 0.1 /100 WBC (ref 0–0.2)
PLATELET # BLD AUTO: 89 10*3/MM3 (ref 140–450)
PMV BLD AUTO: 10.2 FL (ref 6–12)
POTASSIUM BLD-SCNC: 4 MMOL/L (ref 3.5–5.2)
RBC # BLD AUTO: 3.2 10*6/MM3 (ref 4.14–5.8)
SARS-COV-2 RNA PNL SPEC NAA+PROBE: NOT DETECTED
SODIUM BLD-SCNC: 137 MMOL/L (ref 136–145)
VALPROATE SERPL-MCNC: 53.2 MCG/ML (ref 50–125)
WBC NRBC COR # BLD: 6.6 10*3/MM3 (ref 3.4–10.8)

## 2020-06-06 PROCEDURE — 94640 AIRWAY INHALATION TREATMENT: CPT

## 2020-06-06 PROCEDURE — 93005 ELECTROCARDIOGRAM TRACING: CPT | Performed by: STUDENT IN AN ORGANIZED HEALTH CARE EDUCATION/TRAINING PROGRAM

## 2020-06-06 PROCEDURE — 85025 COMPLETE CBC W/AUTO DIFF WBC: CPT | Performed by: STUDENT IN AN ORGANIZED HEALTH CARE EDUCATION/TRAINING PROGRAM

## 2020-06-06 PROCEDURE — 99226 PR SBSQ OBSERVATION CARE/DAY 35 MINUTES: CPT | Performed by: INTERNAL MEDICINE

## 2020-06-06 PROCEDURE — 80048 BASIC METABOLIC PNL TOTAL CA: CPT | Performed by: STUDENT IN AN ORGANIZED HEALTH CARE EDUCATION/TRAINING PROGRAM

## 2020-06-06 PROCEDURE — 94799 UNLISTED PULMONARY SVC/PX: CPT

## 2020-06-06 PROCEDURE — G0378 HOSPITAL OBSERVATION PER HR: HCPCS

## 2020-06-06 PROCEDURE — 83036 HEMOGLOBIN GLYCOSYLATED A1C: CPT | Performed by: STUDENT IN AN ORGANIZED HEALTH CARE EDUCATION/TRAINING PROGRAM

## 2020-06-06 PROCEDURE — 87635 SARS-COV-2 COVID-19 AMP PRB: CPT | Performed by: INTERNAL MEDICINE

## 2020-06-06 PROCEDURE — 80164 ASSAY DIPROPYLACETIC ACD TOT: CPT | Performed by: STUDENT IN AN ORGANIZED HEALTH CARE EDUCATION/TRAINING PROGRAM

## 2020-06-06 RX ORDER — ALBUTEROL SULFATE 90 UG/1
2 AEROSOL, METERED RESPIRATORY (INHALATION)
Status: DISCONTINUED | OUTPATIENT
Start: 2020-06-06 | End: 2020-06-08 | Stop reason: HOSPADM

## 2020-06-06 RX ADMIN — FUROSEMIDE 40 MG: 40 TABLET ORAL at 08:27

## 2020-06-06 RX ADMIN — ALBUTEROL SULFATE 2 PUFF: 90 AEROSOL, METERED RESPIRATORY (INHALATION) at 19:03

## 2020-06-06 RX ADMIN — LORAZEPAM 0.5 MG: 0.5 TABLET ORAL at 08:27

## 2020-06-06 RX ADMIN — DIVALPROEX SODIUM 500 MG: 500 TABLET, DELAYED RELEASE ORAL at 20:03

## 2020-06-06 RX ADMIN — Medication 10 ML: at 20:08

## 2020-06-06 RX ADMIN — MELATONIN TAB 5 MG 5 MG: 5 TAB at 20:04

## 2020-06-06 RX ADMIN — DONEPEZIL HYDROCHLORIDE 20 MG: 5 TABLET, FILM COATED ORAL at 20:04

## 2020-06-06 RX ADMIN — ESCITALOPRAM OXALATE 5 MG: 10 TABLET ORAL at 08:27

## 2020-06-06 RX ADMIN — POTASSIUM CHLORIDE 20 MEQ: 1500 TABLET, EXTENDED RELEASE ORAL at 08:27

## 2020-06-06 RX ADMIN — GUAIFENESIN 1200 MG: 600 TABLET, EXTENDED RELEASE ORAL at 20:04

## 2020-06-06 RX ADMIN — MEMANTINE 10 MG: 10 TABLET ORAL at 08:27

## 2020-06-06 RX ADMIN — GUAIFENESIN 1200 MG: 600 TABLET, EXTENDED RELEASE ORAL at 08:26

## 2020-06-06 RX ADMIN — CLOPIDOGREL BISULFATE 75 MG: 75 TABLET ORAL at 08:27

## 2020-06-06 RX ADMIN — LORAZEPAM 0.5 MG: 0.5 TABLET ORAL at 16:23

## 2020-06-06 RX ADMIN — Medication 10 ML: at 09:21

## 2020-06-06 RX ADMIN — ATORVASTATIN CALCIUM 20 MG: 20 TABLET, FILM COATED ORAL at 20:04

## 2020-06-06 RX ADMIN — ASPIRIN 81 MG: 81 TABLET, COATED ORAL at 08:27

## 2020-06-06 RX ADMIN — MEMANTINE 10 MG: 10 TABLET ORAL at 20:04

## 2020-06-06 RX ADMIN — ISOSORBIDE MONONITRATE 30 MG: 30 TABLET, EXTENDED RELEASE ORAL at 08:27

## 2020-06-06 RX ADMIN — DIVALPROEX SODIUM 500 MG: 500 TABLET, DELAYED RELEASE ORAL at 08:26

## 2020-06-06 RX ADMIN — ALBUTEROL SULFATE 2 PUFF: 90 AEROSOL, METERED RESPIRATORY (INHALATION) at 23:41

## 2020-06-06 RX ADMIN — ALBUTEROL SULFATE 2.5 MG: 2.5 SOLUTION RESPIRATORY (INHALATION) at 07:13

## 2020-06-06 RX ADMIN — DIGOXIN 250 MCG: 250 TABLET ORAL at 11:31

## 2020-06-06 RX ADMIN — LORAZEPAM 0.5 MG: 0.5 TABLET ORAL at 20:04

## 2020-06-06 NOTE — NURSING NOTE
Tried to call Brianne Victor again at [phone] regarding pt fall.  I received a busy signal again.

## 2020-06-06 NOTE — NURSING NOTE
Pt bed alarm went off while I was in room 243.  I ran to pt room and saw the pt fall to the floor landing on his left knee.  He now has an abrasion to that area.  Charge nurse was notified earlier this shift that the pt is non compliant with using his call light and will not wait for staff to assist him.  Charge nurse and MD notified.

## 2020-06-06 NOTE — PLAN OF CARE
Problem: Patient Care Overview  Goal: Plan of Care Review  Outcome: Ongoing (interventions implemented as appropriate)  Flowsheets (Taken 6/6/2020 0147)  Progress: no change  Plan of Care Reviewed With: patient  Note:   Pt remains a high falls risk, pt is noncompliant with his bed alarm and call light.  Pt fell during this shift and suffered an abrasion to his left knee.  Pt now has a sitter at bed side.  Pt remains weak and unsteady on his feet.  Pt remains confused.  He refuses turns by pulling out the wedge and positioning himself how he wants to in the bed. Pt remains on a specialty bed r/t wounds. Pt refuses to use a urinal or male incont wrap for urination.

## 2020-06-06 NOTE — NURSING NOTE
Notified Charge RN immediately once information from Hospice Nurse Mackenzie notified staff patient was active with symptoms and positive for Covid upon leaving [facility] prior to admission to [facility] [place]. Call placed to Dr. Jarquin with new orders for patient to transfer to Covid Positive unit room 203. Report called to nurse on unit.

## 2020-06-06 NOTE — NURSING NOTE
Call placed to [facility] per request of Dr. Jarquin and Charge RN r/t CV19 positive status not being passed on to hospital. Spoke with nurse, Nikolay, who confirmed patient had been positive for CV19 two weeks ago with symptoms. She also stated she was unable to send copy of result as they did not have an actual copy of the result on file. Nikolay also stated the company who ran the lab was [employer]. All information given to Charge RN.

## 2020-06-06 NOTE — SIGNIFICANT NOTE
Received call from bedside nurse Ayah who found out today that this patient was positive 2 weeks ago for COVID and that his wife  from the disease at [facility].  [facility] did not tell the emergency room nor the attending physician of record anything about the patient being positive for the disease.  The patient will be retested today and will be moved to observation and placed in COVID precautions until we receive a negative result.  This was discussed with the bedside nurse.  The charge nurse is aware.  Hopefully they will be follow-up with this facility.  This was something that should have been disclosed by the [facility] to the facility they sent the patient to, instead there was absolutely no documentation of this information anywhere at our facility.

## 2020-06-06 NOTE — PROGRESS NOTES
"      [facility] Medicine Services Daily Progress Note      Hospitalist Team  LOS 0 days      Patient Care Team:  Rolan Amin MD as PCP - General (Internal Medicine)    Patient Location: 203/1      Subjective   Subjective     Chief Complaint / Subjective  Chief Complaint   Patient presents with   • Head Injury     Brief Synopsis of Hospital Course/HPI  The patient is an 87-year-old male who was admitted for syncope and collapse.  The patient has had numerous tests all of which have been unremarkable.  The patient apparently had COVID-19 2 weeks prior to admission that was not disclosed to the hospital by the referring facility.  The patient has been moved to a COVID isolation bolanos.  His initial test was negative here but I am told that he will have to have a second test prior to discharge from that unit.  The patient is nearing maximal benefit of this hospital stay.    Review of Systems   Unable to perform ROS: dementia     Objective   Objective      Vital Signs  Temp:  [97.7 °F (36.5 °C)-97.8 °F (36.6 °C)] 97.7 °F (36.5 °C)  Heart Rate:  [72-82] 77  Resp:  [16-19] 16  BP: (114-161)/(63-84) 150/71  Oxygen Therapy  SpO2: 95 %  Pulse Oximetry Type: Intermittent  Device (Oxygen Therapy): room air  Device (Oxygen Therapy): room air  Flowsheet Rows      First Filed Value   Admission Height  154.9 cm (61\") Documented at 06/05/2020 0034   Admission Weight  71 kg (156 lb 8.4 oz) Documented at 06/05/2020 0034        Intake & Output (last 3 days)       06/03 0701 - 06/04 0700 06/04 0701 - 06/05 0700 06/05 0701 - 06/06 0700 06/06 0701 - 06/07 0700    P.O.   800 600    Total Intake(mL/kg)   800 (11.3) 600 (8.5)    Net   +800 +600            Urine Unmeasured Occurrence   5 x     Stool Unmeasured Occurrence   3 x         Lines, Drains & Airways    Active LDAs     Name:   Placement date:   Placement time:   Site:   Days:    Peripheral IV 06/05/20 1619 Anterior;Right;Upper Arm   06/05/20 1619    Arm   " less than 1              Physical Exam:    Physical Exam   Constitutional: He appears well-developed and well-nourished. No distress.   HENT:   Head: Normocephalic and atraumatic.   Right Ear: External ear normal.   Left Ear: External ear normal.   Nose: Nose normal.   Mouth/Throat: Oropharynx is clear and moist. No oropharyngeal exudate.   Eyes: Pupils are equal, round, and reactive to light. Conjunctivae and EOM are normal. Right eye exhibits no discharge. Left eye exhibits no discharge. No scleral icterus.   Neck: Normal range of motion. No JVD present. No tracheal deviation present. No thyromegaly present.   Cardiovascular: Normal rate, normal heart sounds and intact distal pulses. Exam reveals no gallop and no friction rub.   No murmur heard.  Patient's heart rate is irregularly irregular.   Pulmonary/Chest: Effort normal and breath sounds normal. No stridor. No respiratory distress. He has no wheezes. He has no rales. He exhibits no tenderness.   Abdominal: Soft. Bowel sounds are normal. He exhibits no distension and no mass. There is no tenderness. There is no rebound and no guarding. No hernia.   Musculoskeletal: Normal range of motion. He exhibits no edema, tenderness or deformity.   Lymphadenopathy:     He has no cervical adenopathy.   Neurological: No cranial nerve deficit or sensory deficit. He exhibits normal muscle tone. Coordination normal.   The patient is not oriented to place or situation.  The patient's not able to answer questions.   Skin: Skin is warm and dry. No rash noted. He is not diaphoretic. No erythema.   Psychiatric: He has a normal mood and affect. His behavior is normal.   Nursing note and vitals reviewed.       Wounds (last 24 hours)      LDA Wound     Row Name 06/06/20 1015 06/06/20 0800 06/06/20 0544       Wound 06/05/20 Bilateral medial coccyx Pressure Injury    Wound - Properties Group Date first assessed: 06/05/20  -DE Present on Hospital Admission: Y  -DE Side: Bilateral  -DE  Orientation: medial  -DE Location: coccyx  -DE Primary Wound Type: Pressure inj  -DE Stage, Pressure Injury: deep tissue injury;unstageable  -DE    Dressing Appearance  dry;intact  -KN  --  --    Closure  --  Adhesive bandage  -VW  --    Base  --  non-blanchable  -VW  --    Periwound Temperature  --  warm  -VW  --    Periwound Skin Turgor  --  soft  -VW  --    Edges  --  irregular  -VW  --    Drainage Amount  --  none  -VW  --    Care, Wound  --  barrier applied  -VW  --    Dressing Care, Wound  --  dressing changed  -VW  --    Periwound Care, Wound  --  barrier ointment applied  -VW  --       Wound 06/05/20 1327 scalp Laceration    Wound - Properties Group Date first assessed: 06/05/20  -KT Time first assessed: 1327  -KT Present on Hospital Admission: Y  -KT Location: scalp  -KT Primary Wound Type: Laceration  -KT    Dressing Appearance  open to air  -KN  dried drainage  -VW  --    Closure  --  Sutures;Open to air  -VW  --    Periwound  --  dry  -VW  --    Periwound Temperature  --  warm  -VW  --    Periwound Skin Turgor  --  soft  -VW  --    Edges  --  rolled/closed  -VW  --       Wound 06/05/20 1453 Left lower;posterior;proximal arm Abrasion    Wound - Properties Group Date first assessed: 06/05/20  -KT Time first assessed: 1453  -KT Present on Hospital Admission: Y  -KT Side: Left  -KT Orientation: lower;posterior;proximal  -KT Location: arm  -KT Primary Wound Type: Abrasion  -KT    Dressing Appearance  dry;intact  -KN  --  --       Wound 06/05/20 1459 Left distal;lower;posterior arm Abrasion    Wound - Properties Group Date first assessed: 06/05/20  -KT Time first assessed: 1459  -KT Present on Hospital Admission: Y  -KT Side: Left  -KT Orientation: distal;lower;posterior  -KT Location: arm  -KT Primary Wound Type: Abrasion  -KT    Dressing Appearance  dry;intact  -KN  --  copious drainage  -KW    Closure  --  --  Other (Comment) applied steri strips (2) to skin tear  -KW    Base  --  --  blanchable;bleeding   -KW    Periwound  --  --  hematoma;blanchable;warm  -KW    Periwound Temperature  --  --  warm  -KW    Periwound Skin Turgor  --  --  soft  -KW    Edges  --  --  rolled/closed  -KW    Drainage Characteristics/Odor  --  --  sanguineous  -KW    Drainage Amount  --  --  small small after steri strips applied  -KW    Periwound Care, Wound  --  --  other (see comments) ABD pad, kerlex and coban  -KW    Adhesive Closure Strips  --  --  Applied  -KW       Wound 06/05/20 1503 Right distal;lower;posterior arm    Wound - Properties Group Date first assessed: 06/05/20  -KT Time first assessed: 1503  -KT Present on Hospital Admission: Y  -KT Side: Right  -KT Orientation: distal;lower;posterior  -KT Location: arm  -KT    Dressing Appearance  dry;intact  -KN  --  --       Wound 06/05/20 1847 Left posterior hand Skin Tear    Wound - Properties Group Date first assessed: 06/05/20  -KT Time first assessed: 1847  -KT Present on Hospital Admission: Y  -KT Side: Left  -KT Orientation: posterior  -KT Location: hand  -KT Primary Wound Type: Skin tear  -KT    Dressing Appearance  open to air;no drainage  -KN  --  --       Wound 06/05/20 1855 thoracic spine    Wound - Properties Group Date first assessed: 06/05/20  -KT Time first assessed: 1855  -KT Present on Hospital Admission: Y  -KT Location: thoracic spine  -KT Stage, Pressure Injury: Stage 1  -KT       Wound 06/05/20 1940 thoracic spine    Wound - Properties Group Date first assessed: 06/05/20  -KT, Picture with stage 1 picture  Time first assessed: 1940  -KT Present on Hospital Admission: Y  -KT Location: thoracic spine  -KT Stage, Pressure Injury: Stage 2  -KT       Wound 06/06/20 0251 Left anterior knee Abrasion    Wound - Properties Group Date first assessed: 06/06/20  -KW Time first assessed: 0251  -KW Side: Left  -KW Orientation: anterior  -KW Location: knee  -KW Primary Wound Type: Abrasion  -KW    Row Name 06/06/20 0446 06/06/20 0442 06/06/20 0255       Wound 06/05/20  Bilateral medial coccyx Pressure Injury    Wound - Properties Group Date first assessed: 06/05/20  -DE Present on Hospital Admission: Y  -DE Side: Bilateral  -DE Orientation: medial  -DE Location: coccyx  -DE Primary Wound Type: Pressure inj  -DE Stage, Pressure Injury: deep tissue injury;unstageable  -DE       Wound 06/05/20 1327 scalp Laceration    Wound - Properties Group Date first assessed: 06/05/20  -KT Time first assessed: 1327  -KT Present on Hospital Admission: Y  -KT Location: scalp  -KT Primary Wound Type: Laceration  -KT       Wound 06/05/20 1453 Left lower;posterior;proximal arm Abrasion    Wound - Properties Group Date first assessed: 06/05/20  -KT Time first assessed: 1453  -KT Present on Hospital Admission: Y  -KT Side: Left  -KT Orientation: lower;posterior;proximal  -KT Location: arm  -KT Primary Wound Type: Abrasion  -KT    Dressing Appearance  moist drainage  -KW  --  --    Closure  Adhesive bandage  -KW  --  --    Base  blanchable;maroon/purple  -KW  --  --    Periwound  hematoma  -KW  --  --    Periwound Temperature  warm  -KW  --  --    Periwound Skin Turgor  soft  -KW  --  --    Edges  irregular  -KW  --  --    Drainage Characteristics/Odor  bleeding controlled;serosanguineous  -KW  --  --    Drainage Amount  moderate  -KW  --  --    Periwound Care, Wound  other (see comments) cleaned with NS ABD pad and kerlex  -KW  --  --       Wound 06/05/20 1459 Left distal;lower;posterior arm Abrasion    Wound - Properties Group Date first assessed: 06/05/20  -KT Time first assessed: 1459  -KT Present on Hospital Admission: Y  -KT Side: Left  -KT Orientation: distal;lower;posterior  -KT Location: arm  -KT Primary Wound Type: Abrasion  -KT    Dressing Appearance  --  other (see comments) active bleeding through mepilex  -KW  --    Closure  --  Adhesive bandage  -KW  --    Base  --  blanchable;bleeding;maroon/purple  -KW  --    Periwound Temperature  --  warm  -KW  --    Periwound Skin Turgor  --  soft   -KW  --    Edges  --  irregular  -KW  --    Drainage Characteristics/Odor  --  bleeding controlled  -KW  --    Drainage Amount  --  large  -KW  --    Periwound Care, Wound  --  other (see comments) claned and dressed with abd pad and kerlex  -KW  --       Wound 06/05/20 1503 Right distal;lower;posterior arm    Wound - Properties Group Date first assessed: 06/05/20  -KT Time first assessed: 1503  -KT Present on Hospital Admission: Y  -KT Side: Right  -KT Orientation: distal;lower;posterior  -KT Location: arm  -KT       Wound 06/05/20 1847 Left posterior hand Skin Tear    Wound - Properties Group Date first assessed: 06/05/20  -KT Time first assessed: 1847  -KT Present on Hospital Admission: Y  -KT Side: Left  -KT Orientation: posterior  -KT Location: hand  -KT Primary Wound Type: Skin tear  -KT       Wound 06/05/20 1855 thoracic spine    Wound - Properties Group Date first assessed: 06/05/20  -KT Time first assessed: 1855  -KT Present on Hospital Admission: Y  -KT Location: thoracic spine  -KT Stage, Pressure Injury: Stage 1  -KT       Wound 06/05/20 1940 thoracic spine    Wound - Properties Group Date first assessed: 06/05/20  -KT, Picture with stage 1 picture  Time first assessed: 1940  -KT Present on Hospital Admission: Y  -KT Location: thoracic spine  -KT Stage, Pressure Injury: Stage 2  -KT       Wound 06/06/20 0251 Left anterior knee Abrasion    Wound - Properties Group Date first assessed: 06/06/20  -KW Time first assessed: 0251  -KW Side: Left  -KW Orientation: anterior  -KW Location: knee  -KW Primary Wound Type: Abrasion  -KW    Dressing Appearance  --  --  open to air  -KW    Closure  --  --  Open to air  -KW    Base  --  --  blanchable;dry  -KW    Periwound  --  --  dry  -KW    Periwound Temperature  --  --  warm  -KW    Periwound Skin Turgor  --  --  soft  -KW    Edges  --  --  irregular  -KW    Row Name 06/05/20 2032 06/05/20 1858 06/05/20 1855       Wound 06/05/20 Bilateral medial coccyx Pressure  Injury    Wound - Properties Group Date first assessed: 06/05/20  -DE Present on Hospital Admission: Y  -DE Side: Bilateral  -DE Orientation: medial  -DE Location: coccyx  -DE Primary Wound Type: Pressure inj  -DE Stage, Pressure Injury: deep tissue injury;unstageable  -DE    Closure  Adhesive bandage  -KW  --  --    Base  non-blanchable;blanchable  -KW  --  --    Periwound Temperature  warm  -KW  --  --    Periwound Skin Turgor  soft  -KW  --  --    Edges  irregular  -KW  --  --    Drainage Amount  none  -KW  --  --    Care, Wound  barrier applied  -KW  --  --    Dressing Care, Wound  dressing changed  -KW  --  --    Periwound Care, Wound  barrier ointment applied  -KW  --  --       Wound 06/05/20 1327 scalp Laceration    Wound - Properties Group Date first assessed: 06/05/20  -KT Time first assessed: 1327  -KT Present on Hospital Admission: Y  -KT Location: scalp  -KT Primary Wound Type: Laceration  -KT    Dressing Appearance  dried drainage;dry;intact  -KW  --  --    Closure  Open to air;Sutures  -KW  --  --    Periwound  dry  -KW  --  --    Periwound Temperature  warm  -KW  --  --    Periwound Skin Turgor  soft  -KW  --  --    Edges  rolled/closed  -KW  --  --    Drainage Characteristics/Odor  other (see comments) dried blood  -KW  --  --    Drainage Amount  none  -KW  --  --       Wound 06/05/20 1453 Left lower;posterior;proximal arm Abrasion    Wound - Properties Group Date first assessed: 06/05/20  -KT Time first assessed: 1453  -KT Present on Hospital Admission: Y  -KT Side: Left  -KT Orientation: lower;posterior;proximal  -KT Location: arm  -KT Primary Wound Type: Abrasion  -KT    Dressing Appearance  dried drainage  -KW  --  --    Closure  Adhesive bandage  -KW  --  --    Base  blanchable;moist  -KW  --  --    Edges  irregular  -KW  --  --    Drainage Characteristics/Odor  bleeding controlled  -KW  --  --    Drainage Amount  scant  -KW  --  --    Care, Wound  sterile normal saline  -KW  --  --    Dressing  Care, Wound  dressing changed  -KW  --  --       Wound 06/05/20 1459 Left distal;lower;posterior arm Abrasion    Wound - Properties Group Date first assessed: 06/05/20  -KT Time first assessed: 1459  -KT Present on Hospital Admission: Y  -KT Side: Left  -KT Orientation: distal;lower;posterior  -KT Location: arm  -KT Primary Wound Type: Abrasion  -KT    Dressing Appearance  dry  -KW  --  --    Closure  Adhesive bandage  -KW  --  --    Base  maroon/purple  -KW  --  --    Periwound  dry  -KW  --  --    Edges  irregular;jagged  -KW  --  --    Drainage Amount  scant  -KW  --  --    Care, Wound  sterile normal saline  -KW  --  --    Dressing Care, Wound  dressing changed  -KW  --  --       Wound 06/05/20 1503 Right distal;lower;posterior arm    Wound - Properties Group Date first assessed: 06/05/20  -KT Time first assessed: 1503  -KT Present on Hospital Admission: Y  -KT Side: Right  -KT Orientation: distal;lower;posterior  -KT Location: arm  -KT    Dressing Appearance  dry;intact  -KW  --  --    Closure  Adhesive bandage  -KW  --  --    Base  maroon/purple  -KW  --  --    Edges  irregular;open  -KW  --  --    Drainage Characteristics/Odor  bleeding controlled  -KW  --  --    Drainage Amount  scant  -KW  --  --    Care, Wound  sterile normal saline  -KW  --  --    Dressing Care, Wound  dressing changed  -KW  --  --       [REMOVED] Wound 06/05/20 1620 thoracic spine Abrasion    Wound - Properties Group Date first assessed: 06/05/20  -KT Time first assessed: 1620  -KT Present on Hospital Admission: Y  -KT Location: thoracic spine  -KT Primary Wound Type: Abrasion  -KT Resolution Date: 06/05/20  -KT Resolution Time: 1854  -KT       Wound 06/05/20 1847 Left posterior hand Skin Tear    Wound - Properties Group Date first assessed: 06/05/20  -KT Time first assessed: 1847  -KT Present on Hospital Admission: Y  -KT Side: Left  -KT Orientation: posterior  -KT Location: hand  -KT Primary Wound Type: Skin tear  -KT    Dressing  Appearance  open to air;no drainage  -KW  --  --    Periwound  intact;dry  -KW  --  --    Edges  irregular  -KW  --  --    Drainage Amount  none  -KW  --  --       [REMOVED] Wound 06/05/20 1854 thoracic spine Pressure Injury    Wound - Properties Group Date first assessed: 06/05/20 -KT Time first assessed: 1854  -KT Present on Hospital Admission: Y  -KT Location: thoracic spine  -KT Primary Wound Type: Pressure inj  -KT Stage, Pressure Injury: Stage 2  -KT Resolution Date: 06/05/20  -KT Resolution Time: 1938  -KT       Wound 06/05/20 1855 thoracic spine    Wound - Properties Group Date first assessed: 06/05/20  -KT Time first assessed: 1855  -KT Present on Hospital Admission: Y  -KT Location: thoracic spine  -KT Stage, Pressure Injury: Stage 1  -KT    Wound Image  --  Images linked: 1  -KT  --    Dressing Appearance  dry;intact  -KW  --  intact;dry Mepliex  -KT    Closure  Adhesive bandage  -KW  --  Adhesive bandage  -KT    Base  moist;pink;blanchable  -KW  --  pink  -KT    Periwound  pink  -KW  --  pink  -KT    Edges  irregular  -KW  --  irregular  -KT    Drainage Amount  none  -KW  --  none  -KT    Dressing Care, Wound  --  --  dressing applied Mepliex  -KT       Wound 06/05/20 1940 thoracic spine    Wound - Properties Group Date first assessed: 06/05/20  -KT, Picture with stage 1 picture  Time first assessed: 1940  -KT Present on Hospital Admission: Y  -KT Location: thoracic spine  -KT Stage, Pressure Injury: Stage 2  -KT    Wound Image  --  --  -- Image with stage 1 picture  -KT    Dressing Appearance  dry;intact;no drainage  -KW  --  --    Closure  Adhesive bandage  -KW  --  -- Mepliex  -KT    Base  pink  -KW  --  pink  -KT    Periwound  pink  -KW  --  pink  -KT    Edges  irregular  -KW  --  irregular Open  -KT       Wound 06/06/20 0251 Left anterior knee Abrasion    Wound - Properties Group Date first assessed: 06/06/20  -KW Time first assessed: 0251  -KW Side: Left  -KW Orientation: anterior  -KW Location:  knee  -KW Primary Wound Type: Abrasion  -KW    Row Name 06/05/20 1847 06/05/20 1620          Wound 06/05/20 Bilateral medial coccyx Pressure Injury    Wound - Properties Group Date first assessed: 06/05/20  -DE Present on Hospital Admission: Y  -DE Side: Bilateral  -DE Orientation: medial  -DE Location: coccyx  -DE Primary Wound Type: Pressure inj  -DE Stage, Pressure Injury: deep tissue injury;unstageable  -DE       Wound 06/05/20 1327 scalp Laceration    Wound - Properties Group Date first assessed: 06/05/20  -KT Time first assessed: 1327  -KT Present on Hospital Admission: Y  -KT Location: scalp  -KT Primary Wound Type: Laceration  -KT       Wound 06/05/20 1453 Left lower;posterior;proximal arm Abrasion    Wound - Properties Group Date first assessed: 06/05/20  -KT Time first assessed: 1453  -KT Present on Hospital Admission: Y  -KT Side: Left  -KT Orientation: lower;posterior;proximal  -KT Location: arm  -KT Primary Wound Type: Abrasion  -KT       Wound 06/05/20 1459 Left distal;lower;posterior arm Abrasion    Wound - Properties Group Date first assessed: 06/05/20  -KT Time first assessed: 1459  -KT Present on Hospital Admission: Y  -KT Side: Left  -KT Orientation: distal;lower;posterior  -KT Location: arm  -KT Primary Wound Type: Abrasion  -KT       Wound 06/05/20 1503 Right distal;lower;posterior arm    Wound - Properties Group Date first assessed: 06/05/20  -KT Time first assessed: 1503  -KT Present on Hospital Admission: Y  -KT Side: Right  -KT Orientation: distal;lower;posterior  -KT Location: arm  -KT       [REMOVED] Wound 06/05/20 1620 thoracic spine Abrasion    Wound - Properties Group Date first assessed: 06/05/20  -KT Time first assessed: 1620  -KT Present on Hospital Admission: Y  -KT Location: thoracic spine  -KT Primary Wound Type: Abrasion  -KT Resolution Date: 06/05/20  -KT Resolution Time: 1854  -KT    Dressing Appearance  --  dry;intact Mepliex  -KT     Periwound  --  redness  -KT     Edges  --   irregular  -KT     Drainage Amount  --  none  -KT     Care, Wound  --  sterile normal saline;cleansed with  -KT     Dressing Care, Wound  --  dressing applied  -KT        Wound 06/05/20 1847 Left posterior hand Skin Tear    Wound - Properties Group Date first assessed: 06/05/20  -KT Time first assessed: 1847  -KT Present on Hospital Admission: Y  -KT Side: Left  -KT Orientation: posterior  -KT Location: hand  -KT Primary Wound Type: Skin tear  -KT    Dressing Appearance  open to air  -KT  --     Periwound  dry  -KT  --     Edges  irregular  -KT  --     Drainage Characteristics/Odor  other (see comments) Dried blood  -KT  --     Drainage Amount  none  -KT  --        [REMOVED] Wound 06/05/20 1854 thoracic spine Pressure Injury    Wound - Properties Group Date first assessed: 06/05/20  -KT Time first assessed: 1854  -KT Present on Hospital Admission: Y  -KT Location: thoracic spine  -KT Primary Wound Type: Pressure inj  -KT Stage, Pressure Injury: Stage 2  -KT Resolution Date: 06/05/20  -KT Resolution Time: 1938  -KT       Wound 06/05/20 1855 thoracic spine    Wound - Properties Group Date first assessed: 06/05/20  -KT Time first assessed: 1855  -KT Present on Hospital Admission: Y  -KT Location: thoracic spine  -KT Stage, Pressure Injury: Stage 1  -KT      User Key  (r) = Recorded By, (t) = Taken By, (c) = Cosigned By    Initials Name Provider Type    Larissa Spivey, RN Registered Nurse    Keith Ramirez LPN Licensed Nurse    Ayah Miller LPN Licensed Nurse    Geraldine Haney LPN Licensed Nurse    Renita Ya, RN Registered Nurse        Procedures:    Results Review:     I reviewed the patient's new clinical results.    Lab Results (last 24 hours)     Procedure Component Value Units Date/Time    COVID-19,[facility]-HOUSE, NP SWAB IN TRANSPORT MEDIA 8-12 HR TAT - Swab, Nasopharynx [[id number]]  (Normal) Collected:  06/06/20 0921    Specimen:  Swab from Nasopharynx Updated:  06/06/20  1308     COVID19 Not Detected    BUN [[id number]]  (Normal) Collected:  06/06/20 0313    Specimen:  Blood Updated:  06/06/20 0927     BUN 9 mg/dL     Valproic Acid Level, Total [[id number]]  (Normal) Collected:  06/06/20 0313    Specimen:  Blood Updated:  06/06/20 0422     Valproic Acid 53.2 mcg/mL     Basic Metabolic Panel [[id number]]  (Abnormal) Collected:  06/06/20 0313    Specimen:  Blood Updated:  06/06/20 0419     Glucose 112 mg/dL      BUN --     Comment: Testing performed by alternate method        Creatinine 0.64 mg/dL      Sodium 137 mmol/L      Potassium 4.0 mmol/L      Chloride 103 mmol/L      CO2 26.0 mmol/L      Calcium 8.7 mg/dL      eGFR Non African Amer 118 mL/min/1.73      BUN/Creatinine Ratio --     Comment: Testing not performed.        Anion Gap 8.0 mmol/L     Narrative:       GFR Normal >60  Chronic Kidney Disease <60  Kidney Failure <15      CBC Auto Differential [[id number]]  (Abnormal) Collected:  06/06/20 0313    Specimen:  Blood Updated:  06/06/20 0406     WBC 6.60 10*3/mm3      RBC 3.20 10*6/mm3      Hemoglobin 9.9 g/dL      Hematocrit 30.6 %      MCV 95.7 fL      MCH 31.0 pg      MCHC 32.4 g/dL      RDW 18.6 %      RDW-SD 63.4 fl      MPV 10.2 fL      Platelets 89 10*3/mm3      Neutrophil % 60.5 %      Lymphocyte % 18.0 %      Monocyte % 20.6 %      Eosinophil % 0.2 %      Basophil % 0.7 %      Neutrophils, Absolute 4.00 10*3/mm3      Lymphocytes, Absolute 1.20 10*3/mm3      Monocytes, Absolute 1.40 10*3/mm3      Eosinophils, Absolute 0.00 10*3/mm3      Basophils, Absolute 0.00 10*3/mm3      nRBC 0.1 /100 WBC     Hemoglobin A1c [[id number]] Collected:  06/06/20 0312    Specimen:  Blood Updated:  06/06/20 0352        No results found for: HGBA1C  Results from last 7 days   Lab Units 06/05/20  0044   INR  1.12*         No results found for: LIPASE  Lab Results   Component Value Date    CHOL 138 09/21/2018    TRIG 55 09/21/2018    HDL 71 09/21/2018    LDL 44 09/21/2018     No results found for:  INTRAOP, PREDX, FINALDX, COMDX    Microbiology Results (last 10 days)     Procedure Component Value - Date/Time    COVID-19,[facility], NP SWAB IN TRANSPORT MEDIA 8-12 HR TAT - Swab, Nasopharynx [[id number]]  (Normal) Collected:  06/06/20 0921    Lab Status:  Final result Specimen:  Swab from Nasopharynx Updated:  06/06/20 1308     COVID19 Not Detected        ECG/EMG Results (most recent)     Procedure Component Value Units Date/Time    Adult Transthoracic Echo Complete With Contrast if Necessary Per Protocol (With Agitated Saline) [[id number]] Collected:  06/05/20 0801     Updated:  06/05/20 1113     BSA 1.7 m^2      RVIDd 3.6 cm      IVSd 1.1 cm      IVSs 1.7 cm      LVIDd 4.6 cm      LVIDs 2.7 cm      LVPWd 1.1 cm       CV ECHO DEBORAH - LVPWS 1.4 cm      IVS/LVPW 0.98     FS 41.3 %      EDV(Teich) 95.4 ml      ESV(Teich) 26.5 ml      EF(Teich) 72.3 %      EDV(cubed) 94.9 ml      ESV(cubed) 19.2 ml      EF(cubed) 79.8 %      % IVS thick 49.4 %      % LVPW thick 25.9 %      LV mass(C)d 183.2 grams      LV mass(C)dI 107.8 grams/m^2      LV mass(C)s 143.3 grams      LV mass(C)sI 84.3 grams/m^2      SV(Teich) 69.0 ml      SI(Teich) 40.6 ml/m^2      SV(cubed) 75.7 ml      SI(cubed) 44.6 ml/m^2      Ao root diam 3.5 cm      Ao root area 9.7 cm^2      ACS 2.0 cm      LVOT diam 2.3 cm      LVOT area 4.2 cm^2      EDV(MOD-sp4) 68.3 ml      ESV(MOD-sp4) 26.5 ml      EF(MOD-sp4) 61.2 %      SV(MOD-sp4) 41.8 ml      SI(MOD-sp4) 24.6 ml/m^2      Ao root area (BSA corrected) 2.1     LV Bermudez Vol (BSA corrected) 40.2 ml/m^2      LV Sys Vol (BSA corrected) 15.6 ml/m^2      Aortic R-R 0.23 sec      Aortic .8 BPM      MV E max mike 76.5 cm/sec      MV A max mike 104.0 cm/sec      MV E/A 0.74     MV V2 max 104.5 cm/sec      MV max PG 4.4 mmHg      MV V2 mean 60.6 cm/sec      MV mean PG 1.7 mmHg      MV V2 VTI 17.6 cm      MVA(VTI) 3.3 cm^2      MV dec slope 355.8 cm/sec^2      MV dec time 0.22 sec      Ao pk mike 96.4 cm/sec       Ao max PG 3.7 mmHg      Ao max PG (full) 1.4 mmHg      Ao V2 mean 73.9 cm/sec      Ao mean PG 2.3 mmHg      Ao mean PG (full) 0.97 mmHg      Ao V2 VTI 16.9 cm      RUTHANN(I,A) 3.5 cm^2      RUTHANN(I,D) 3.5 cm^2      RUTHANN(V,A) 3.3 cm^2      RUTHANN(V,D) 3.3 cm^2      LV V1 max PG 2.4 mmHg      LV V1 mean PG 1.4 mmHg      LV V1 max 76.8 cm/sec      LV V1 mean 54.4 cm/sec      LV V1 VTI 14.1 cm      CO(Ao) 43.1 l/min      CI(Ao) 25.4 l/min/m^2      SV(Ao) 164.1 ml      SI(Ao) 96.5 ml/m^2      CO(LVOT) 15.4 l/min      CI(LVOT) 9.1 l/min/m^2      SV(LVOT) 58.7 ml      SI(LVOT) 34.5 ml/m^2      PA V2 max 80.4 cm/sec      PA max PG 2.6 mmHg      PA max PG (full) 0.7 mmHg      PA V2 mean 57.5 cm/sec      PA mean PG 1.5 mmHg      PA mean PG (full) 0.54 mmHg      PA V2 VTI 17.1 cm      PA acc time 0.11 sec      RV V1 max PG 1.9 mmHg      RV V1 mean PG 0.91 mmHg      RV V1 max 68.6 cm/sec      RV V1 mean 44.5 cm/sec      RV V1 VTI 12.8 cm      TR max mike 248.0 cm/sec      RVSP(TR) 27.6 mmHg      RAP systole 3.0 mmHg      PA pr(Accel) 27.6 mmHg       CV ECHO DEBORAH - BZI_BMI 29.5 kilograms/m^2       CV ECHO DEBORAH - BSA(HAYCOCK) 1.8 m^2       CV ECHO DEBORAH - BZI_METRIC_WEIGHT 70.8 kg       CV ECHO DEBORAH - BZI_METRIC_HEIGHT 154.9 cm      Target HR (85%) 113 bpm      Max. Pred. HR (100%) 133 bpm      EF(MOD-bp) 72.0 %      LA dimension(2D) 3.6 cm     Narrative:         · Left ventricular wall thickness is consistent with mild concentric   hypertrophy.  · Left ventricular systolic function is normal.  · Left ventricular diastolic dysfunction (grade I a) consistent with   impaired relaxation.  · Mild tricuspid valve regurgitation is present.     Normal LV and RV size and function  Mild concentric LVH  Grade 1 diastolic dysfunction by criteria  Normal atrial sizes  Normal IVC caliber in diameter and collapsibility with normal right sided   pressures estimated, no significant pulmonary hypertension seen  EF estimated 65 to 70%  Mild TR,  trace to mild MR, trace AI  No significant valvular stenoses or elevated gradients seen  No masses  No pericardial effusion seen      ECG 12 Lead [[id number]] Collected:  06/06/20 0552     Updated:  06/06/20 0555    Narrative:       HEART RATE= 72  bpm  RR Interval= 828  ms  CT Interval= 203  ms  P Horizontal Axis= -44  deg  P Front Axis= 70  deg  QRSD Interval= 95  ms  QT Interval= 373  ms  QRS Axis= 33  deg  T Wave Axis= 43  deg  - ABNORMAL ECG -  Sinus rhythm  Anterior infarct, old  When compared with ECG of 05-Jun-2020 0:40:51,  Significant axis, voltage or hypertrophy change  Electronically Signed By:   Date and Time of Study: 2020-06-06 05:52:02    ECG 12 Lead [[id number]] Collected:  06/05/20 0040     Updated:  06/06/20 0936    Narrative:       HEART RATE= 82  bpm  RR Interval= 728  ms  CT Interval= 210  ms  P Horizontal Axis=   deg  P Front Axis= 79  deg  QRSD Interval= 88  ms  QT Interval= 344  ms  QRS Axis= 62  deg  T Wave Axis= 74  deg  - BORDERLINE ECG -  Sinus rhythm  Low voltage with right axis deviation  When compared with ECG of 20-Nov-2019 9:50:58,  Significant rate increase  Significant axis, voltage or hypertrophy change  Electronically Signed By: Mike Bravo (Abiodun) 06-Jun-2020 09:28:14  Date and Time of Study: 2020-06-05 00:40:51        Results for orders placed during the hospital encounter of 06/05/20   Bilateral Carotid Duplex    Narrative · Proximal right internal carotid artery plaque without significant   stenosis.  · Proximal left internal carotid artery plaque without significant   stenosis.          Results for orders placed during the hospital encounter of 06/05/20   Adult Transthoracic Echo Complete With Contrast if Necessary Per Protocol (With Agitated Saline)    Narrative · Left ventricular wall thickness is consistent with mild concentric   hypertrophy.  · Left ventricular systolic function is normal.  · Left ventricular diastolic dysfunction (grade I a) consistent with   impaired  relaxation.  · Mild tricuspid valve regurgitation is present.     Normal LV and RV size and function  Mild concentric LVH  Grade 1 diastolic dysfunction by criteria  Normal atrial sizes  Normal IVC caliber in diameter and collapsibility with normal right sided   pressures estimated, no significant pulmonary hypertension seen  EF estimated 65 to 70%  Mild TR, trace to mild MR, trace AI  No significant valvular stenoses or elevated gradients seen  No masses  No pericardial effusion seen       Ct Head Without Contrast    Result Date: 6/4/2020  1. No acute intracranial process. Frontal scalp soft tissue swelling/hematoma. 2. Mild changes small vessel ischemic disease of indeterminate age, presumably mostly chronic. Volume loss. Atherosclerosis. 3. Left mastoid effusion. Paranasal sinus disease.  Electronically signed by:  Jairon Sykes M.D.  6/4/2020 11:32 PM    Ct Cervical Spine Without Contrast    Result Date: 6/4/2020  1. No acute osseous abnormality. Reversal normal lordotic curvature. Osteopenia. Multilevel degenerative changes. 2. Small to moderate left pleural effusion with atelectasis.  Electronically signed by:  Jairon Sykes M.D.  6/4/2020 11:38 PM    Xr Chest 1 View    Result Date: 6/5/2020  1. Left retrocardiac airspace opacity likely representing left lower lobe atelectasis. 2. Small left-sided pleural effusion.  Electronically Signed ByRafael Caballero On:6/5/2020 7:39 AM This report was finalized on [id number] by  Fabiano Caballero .    Xr Pelvis 1 Or 2 View    Result Date: 6/5/2020  1. No acute fracture or dislocation. 2. Severe left hip osteoarthritis  Electronically Signed ByRafael Caballero On:6/5/2020 7:41 AM This report was finalized on [id number] by  Fabiano Caabllero .    Xrays, labs reviewed personally by physician.    Medication Review:   I have reviewed the patient's current medication list    Scheduled Meds    albuterol sulfate HFA 2 puff Inhalation Q4H - RT   aspirin 81 mg  Oral Daily   atorvastatin 20 mg Oral Nightly   clopidogrel 75 mg Oral Daily   collagenase  Topical Q24H   digoxin 250 mcg Oral Daily   divalproex 500 mg Oral BID   donepezil 20 mg Oral Nightly   escitalopram 5 mg Oral Daily   furosemide 40 mg Oral Daily   guaiFENesin 1,200 mg Oral Q12H   isosorbide mononitrate 30 mg Oral Daily   LORazepam 0.5 mg Oral TID   memantine 10 mg Oral BID   potassium chloride 20 mEq Oral Daily   sodium chloride 10 mL Intravenous Q12H     Meds Infusions     Meds PRN  •  acetaminophen  •  aluminum-magnesium hydroxide-simethicone  •  magnesium hydroxide  •  melatonin  •  morphine sulfate (concentrate)  •  nitroglycerin  •  ondansetron **OR** ondansetron  •  sodium chloride    Assessment/Plan   Assessment/Plan     Active Hospital Problems    Diagnosis  POA   • **Syncope and collapse [R55]  Yes   • Hyperlipidemia [E78.5]  Yes   • Elevated random blood glucose level [R73.09]  Yes   • Seizure disorder (CMS/HCC) [G40.909]  Yes   • Paroxysmal atrial fibrillation (CMS/HCC) [I48.0]  Yes   • Hypertension [I10]  Yes   • Gastroesophageal reflux disease [K21.9]  Yes   • Dementia (CMS/HCC) [F03.90]  Yes   • Chronic obstructive lung disease (CMS/HCC) [J44.9]  Yes   • Anemia [D64.9]  Yes      Resolved Hospital Problems   No resolved problems to display.       MEDICAL DECISION MAKING COMPLEXITY BY PROBLEM:   1.  Syncope   -Patient ruled out for myocardial infarction  - carotid Doppler showed no areas of stenosis  -CT scan showed no evidence of cervical spine or brain injury/stroke/etiology for the syncope.  -Echocardiogram with no evidence of mural thrombus or abnormality    2.  Elevation of glucose  -These are only mildly elevated will follow    3.  COPD not currently exacerbated  -Continue breathing treatments    4.  Hypertension  -Continue Lasix as per home dosing    5.  Coronary artery disease  -Continue Imdur    6.  Atrial fibrillation-chronic  -Continue Plavix aspirin and Lanoxin    7.  Anemia of  chronic disease  -Stable    8.  Seizure disorder  -Continue Depakote  -Valproic acid level therapeutic    9.  Hyperlipidemia  -Continue statin therapy    10.  Gastroesophageal reflux disease  -Continue Aricept, Lexapro, Namenda    11.  History of COVID 19  -Patient apparently was +2 weeks ago but this information was not given to the hospital by the referring nursing home which was [facility]       -The patient was transferred to the COVID determination [facility] as soon as the hospital found this information.       -The patient has now tested negative    The patient will likely be discharged back to the nursing home tomorrow.    VTE Prophylaxis -   Mechanical Order History:      Ordered        06/05/20 0423  Place Sequential Compression Device  Once         06/05/20 0423  Maintain Sequential Compression Device  Continuous                 Pharmalogical Order History:     None        Code Status -   Code Status and Medical Interventions:   Ordered at: 06/05/20 0423     Code Status:    CPR     Medical Interventions (Level of Support Prior to Arrest):    Full     This patient has been examined wearing appropriate Personal Protective Equipment and discussed with hospital infection control department. 06/06/20    Discharge Planning    Destination - Selection Complete      Service Provider Request Status Selected Services Address Phone Number Fax Number    [facility] [place] Selected [facility]illed Nursing [address] [place] [id number] [phone]      Durable Medical Equipment      Coordination has not been started for this encounter.      Dialysis/Infusion      Coordination has not been started for this encounter.      Home Medical Care      Service Provider Request Status Selected Services Address Phone Number Fax Number    [facility] [place] [facility] [address] [place] [address] [place] [phone] --      Therapy      Coordination has not been started  for this encounter.      Community Resources      Coordination has not been started for this encounter.        Electronically signed by Addie Jarquin MD, 06/06/20, 15:24.  [facility] Hospitalist Team

## 2020-06-06 NOTE — NURSING NOTE
I tried to call Brianne Victor at the number listed for emergency contact regarding pt  Fall and recieved a busy signal.  Will try again later.

## 2020-06-07 LAB
ANION GAP SERPL CALCULATED.3IONS-SCNC: 7 MMOL/L (ref 5–15)
BASOPHILS # BLD AUTO: 0 10*3/MM3 (ref 0–0.2)
BASOPHILS NFR BLD AUTO: 0.2 % (ref 0–1.5)
BUN BLD-MCNC: 11 MG/DL (ref 8–23)
BUN BLD-MCNC: ABNORMAL MG/DL
BUN/CREAT SERPL: ABNORMAL
CALCIUM SPEC-SCNC: 8.7 MG/DL (ref 8.6–10.5)
CHLORIDE SERPL-SCNC: 103 MMOL/L (ref 98–107)
CO2 SERPL-SCNC: 28 MMOL/L (ref 22–29)
CREAT BLD-MCNC: 0.57 MG/DL (ref 0.76–1.27)
DEPRECATED RDW RBC AUTO: 61.3 FL (ref 37–54)
EOSINOPHIL # BLD AUTO: 0 10*3/MM3 (ref 0–0.4)
EOSINOPHIL NFR BLD AUTO: 0.2 % (ref 0.3–6.2)
ERYTHROCYTE [DISTWIDTH] IN BLOOD BY AUTOMATED COUNT: 18.4 % (ref 12.3–15.4)
GFR SERPL CREATININE-BSD FRML MDRD: 135 ML/MIN/1.73
GLUCOSE BLD-MCNC: 105 MG/DL (ref 65–99)
HCT VFR BLD AUTO: 30.9 % (ref 37.5–51)
HGB BLD-MCNC: 10.2 G/DL (ref 13–17.7)
LYMPHOCYTES # BLD AUTO: 1.2 10*3/MM3 (ref 0.7–3.1)
LYMPHOCYTES NFR BLD AUTO: 20.9 % (ref 19.6–45.3)
MCH RBC QN AUTO: 31.3 PG (ref 26.6–33)
MCHC RBC AUTO-ENTMCNC: 32.9 G/DL (ref 31.5–35.7)
MCV RBC AUTO: 95.1 FL (ref 79–97)
MONOCYTES # BLD AUTO: 1.2 10*3/MM3 (ref 0.1–0.9)
MONOCYTES NFR BLD AUTO: 19.5 % (ref 5–12)
NEUTROPHILS # BLD AUTO: 3.5 10*3/MM3 (ref 1.7–7)
NEUTROPHILS NFR BLD AUTO: 59.2 % (ref 42.7–76)
NRBC BLD AUTO-RTO: 0.2 /100 WBC (ref 0–0.2)
PLATELET # BLD AUTO: 90 10*3/MM3 (ref 140–450)
PMV BLD AUTO: 10.2 FL (ref 6–12)
POTASSIUM BLD-SCNC: 3.9 MMOL/L (ref 3.5–5.2)
RBC # BLD AUTO: 3.25 10*6/MM3 (ref 4.14–5.8)
SARS-COV-2 RNA PNL SPEC NAA+PROBE: DETECTED
SODIUM BLD-SCNC: 138 MMOL/L (ref 136–145)
WBC NRBC COR # BLD: 6 10*3/MM3 (ref 3.4–10.8)

## 2020-06-07 PROCEDURE — 99225 PR SBSQ OBSERVATION CARE/DAY 25 MINUTES: CPT | Performed by: INTERNAL MEDICINE

## 2020-06-07 PROCEDURE — 87635 SARS-COV-2 COVID-19 AMP PRB: CPT | Performed by: INTERNAL MEDICINE

## 2020-06-07 PROCEDURE — 80048 BASIC METABOLIC PNL TOTAL CA: CPT | Performed by: STUDENT IN AN ORGANIZED HEALTH CARE EDUCATION/TRAINING PROGRAM

## 2020-06-07 PROCEDURE — 94799 UNLISTED PULMONARY SVC/PX: CPT

## 2020-06-07 PROCEDURE — 85025 COMPLETE CBC W/AUTO DIFF WBC: CPT | Performed by: STUDENT IN AN ORGANIZED HEALTH CARE EDUCATION/TRAINING PROGRAM

## 2020-06-07 PROCEDURE — G0378 HOSPITAL OBSERVATION PER HR: HCPCS

## 2020-06-07 PROCEDURE — 93010 ELECTROCARDIOGRAM REPORT: CPT | Performed by: INTERNAL MEDICINE

## 2020-06-07 RX ADMIN — ALBUTEROL SULFATE 2 PUFF: 90 AEROSOL, METERED RESPIRATORY (INHALATION) at 07:45

## 2020-06-07 RX ADMIN — ISOSORBIDE MONONITRATE 30 MG: 30 TABLET, EXTENDED RELEASE ORAL at 09:44

## 2020-06-07 RX ADMIN — LORAZEPAM 0.5 MG: 0.5 TABLET ORAL at 09:44

## 2020-06-07 RX ADMIN — GUAIFENESIN 1200 MG: 600 TABLET, EXTENDED RELEASE ORAL at 09:44

## 2020-06-07 RX ADMIN — Medication 10 ML: at 09:45

## 2020-06-07 RX ADMIN — ALBUTEROL SULFATE 2 PUFF: 90 AEROSOL, METERED RESPIRATORY (INHALATION) at 19:01

## 2020-06-07 RX ADMIN — POTASSIUM CHLORIDE 20 MEQ: 1500 TABLET, EXTENDED RELEASE ORAL at 09:45

## 2020-06-07 RX ADMIN — DIVALPROEX SODIUM 500 MG: 500 TABLET, DELAYED RELEASE ORAL at 09:44

## 2020-06-07 RX ADMIN — MEMANTINE 10 MG: 10 TABLET ORAL at 09:45

## 2020-06-07 RX ADMIN — MEMANTINE 10 MG: 10 TABLET ORAL at 20:35

## 2020-06-07 RX ADMIN — CLOPIDOGREL BISULFATE 75 MG: 75 TABLET ORAL at 09:44

## 2020-06-07 RX ADMIN — ESCITALOPRAM OXALATE 5 MG: 10 TABLET ORAL at 09:45

## 2020-06-07 RX ADMIN — ATORVASTATIN CALCIUM 20 MG: 20 TABLET, FILM COATED ORAL at 20:35

## 2020-06-07 RX ADMIN — COLLAGENASE SANTYL: 250 OINTMENT TOPICAL at 11:36

## 2020-06-07 RX ADMIN — DIGOXIN 250 MCG: 250 TABLET ORAL at 11:36

## 2020-06-07 RX ADMIN — ALBUTEROL SULFATE 2 PUFF: 90 AEROSOL, METERED RESPIRATORY (INHALATION) at 15:31

## 2020-06-07 RX ADMIN — Medication 10 ML: at 20:34

## 2020-06-07 RX ADMIN — DONEPEZIL HYDROCHLORIDE 20 MG: 5 TABLET, FILM COATED ORAL at 20:35

## 2020-06-07 RX ADMIN — LORAZEPAM 0.5 MG: 0.5 TABLET ORAL at 20:35

## 2020-06-07 RX ADMIN — ASPIRIN 81 MG: 81 TABLET, COATED ORAL at 09:44

## 2020-06-07 RX ADMIN — GUAIFENESIN 1200 MG: 600 TABLET, EXTENDED RELEASE ORAL at 20:35

## 2020-06-07 RX ADMIN — FUROSEMIDE 40 MG: 40 TABLET ORAL at 09:44

## 2020-06-07 RX ADMIN — ALBUTEROL SULFATE 2 PUFF: 90 AEROSOL, METERED RESPIRATORY (INHALATION) at 11:29

## 2020-06-07 RX ADMIN — DIVALPROEX SODIUM 500 MG: 500 TABLET, DELAYED RELEASE ORAL at 20:35

## 2020-06-07 RX ADMIN — ALBUTEROL SULFATE 2 PUFF: 90 AEROSOL, METERED RESPIRATORY (INHALATION) at 23:00

## 2020-06-07 RX ADMIN — LORAZEPAM 0.5 MG: 0.5 TABLET ORAL at 15:17

## 2020-06-07 NOTE — PLAN OF CARE
Pt doing good without a sitter will continue to monitor  Problem: Patient Care Overview  Goal: Plan of Care Review  Outcome: Ongoing (interventions implemented as appropriate)  Goal: Individualization and Mutuality  Outcome: Ongoing (interventions implemented as appropriate)  Goal: Discharge Needs Assessment  Outcome: Ongoing (interventions implemented as appropriate)  Goal: Interprofessional Rounds/Family Conf  Outcome: Ongoing (interventions implemented as appropriate)     Problem: Fall Risk (Adult)  Goal: Identify Related Risk Factors and Signs and Symptoms  Description  Related risk factors and signs and symptoms are identified upon initiation of Human Response Clinical Practice Guideline (CPG).  Outcome: Ongoing (interventions implemented as appropriate)  Goal: Absence of Fall  Description  Patient will demonstrate the desired outcomes by discharge/transition of care.  Outcome: Ongoing (interventions implemented as appropriate)     Problem: Skin Injury Risk (Adult)  Goal: Identify Related Risk Factors and Signs and Symptoms  Description  Related risk factors and signs and symptoms are identified upon initiation of Human Response Clinical Practice Guideline (CPG).  Outcome: Ongoing (interventions implemented as appropriate)  Goal: Skin Health and Integrity  Description  Patient will demonstrate the desired outcomes by discharge/transition of care.  Outcome: Ongoing (interventions implemented as appropriate)     Problem: Syncope (Adult)  Goal: Identify Related Risk Factors and Signs and Symptoms  Description  Related risk factors and signs and symptoms are identified upon initiation of Human Response Clinical Practice Guideline (CPG).  Outcome: Ongoing (interventions implemented as appropriate)  Goal: Physical Safety/Health Maintenance  Description  Patient will demonstrate the desired outcomes by discharge/transition of care.  Outcome: Ongoing (interventions implemented as appropriate)  Goal: Optimal  Emotional/Functional [place]  Description  Patient will demonstrate the desired outcomes by discharge/transition of care.  Outcome: Ongoing (interventions implemented as appropriate)     Problem: Activity Intolerance (Adult)  Goal: Identify Related Risk Factors and Signs and Symptoms  Description  Related risk factors and signs and symptoms are identified upon initiation of Human Response Clinical Practice Guideline (CPG).  Outcome: Ongoing (interventions implemented as appropriate)  Goal: Activity Tolerance  Description  Patient will demonstrate the desired outcomes by discharge/transition of care.  Outcome: Ongoing (interventions implemented as appropriate)  Goal: Effective Energy Conservation Techniques  Description  Patient will demonstrate the desired outcomes by discharge/transition of care.  Outcome: Ongoing (interventions implemented as appropriate)

## 2020-06-07 NOTE — PROGRESS NOTES
"      [facility] Medicine Services Daily Progress Note      Hospitalist Team  LOS 0 days      Patient Care Team:  Rolan Amin MD as PCP - General (Internal Medicine)    Patient Location: 203/1      Subjective   Subjective     Chief Complaint / Subjective  Chief Complaint   Patient presents with   • Head Injury     Brief Synopsis of Hospital Course/HPI  The patient is an 87-year-old male who was admitted for syncope and collapse.  The patient has had numerous tests all of which have been unremarkable.  The patient apparently had COVID-19 2 weeks prior to admission that was not disclosed to the hospital by the referring facility.  The patient has been moved to a COVID isolation bolanos.  His initial test was negative here but I am told that he will have to have a second test prior to discharge from that unit.  The patient is nearing maximal benefit of this hospital stay.    6/7/2020  Today the patient is better by report of nursing.  Apparently he is now able to press his call button for them to come and help him go to the bathroom.  He has had a sitter since admission, however today we have been able to remove the sitter and he has been doing well without one.  The patient's first COVID test was negative.  Apparently there has to be a second one prior to moving him from the COVID unit.    Review of Systems   Unable to perform ROS: dementia     Objective   Objective      Vital Signs  Temp:  [97.4 °F (36.3 °C)-98.4 °F (36.9 °C)] 97.8 °F (36.6 °C)  Heart Rate:  [69-94] 93  Resp:  [15-20] 20  BP: (118-124)/(53-79) 120/79  Oxygen Therapy  SpO2: 96 %  Pulse Oximetry Type: Continuous  Device (Oxygen Therapy): room air  Device (Oxygen Therapy): room air  Flowsheet Rows      First Filed Value   Admission Height  154.9 cm (61\") Documented at 06/05/2020 0034   Admission Weight  71 kg (156 lb 8.4 oz) Documented at 06/05/2020 0034        Intake & Output (last 3 days)       06/04 0701 - 06/05 0700 06/05 0701 " - 06/06 0700 06/06 0701 - 06/07 0700 06/07 0701 - 06/08 0700    P.O.  800 840 360    Total Intake(mL/kg)  800 (11.3) 840 (11.9) 360 (5.1)    Net  +800 +840 +360            Urine Unmeasured Occurrence  6 x 6 x 3 x    Stool Unmeasured Occurrence  3 x  1 x        Lines, Drains & Airways    Active LDAs     Name:   Placement date:   Placement time:   Site:   Days:    Peripheral IV 06/05/20 1619 Anterior;Right;Upper Arm   06/05/20 1619    Arm   less than 1              Physical Exam:    Physical Exam   Constitutional: He appears well-developed and well-nourished. No distress.   HENT:   Head: Normocephalic and atraumatic.   Right Ear: External ear normal.   Left Ear: External ear normal.   Nose: Nose normal.   Mouth/Throat: Oropharynx is clear and moist. No oropharyngeal exudate.   Eyes: Pupils are equal, round, and reactive to light. Conjunctivae and EOM are normal. Right eye exhibits no discharge. Left eye exhibits no discharge. No scleral icterus.   Neck: Normal range of motion. No JVD present. No tracheal deviation present. No thyromegaly present.   Cardiovascular: Normal rate, regular rhythm, normal heart sounds and intact distal pulses. Exam reveals no gallop and no friction rub.   No murmur heard.  Patient's heart rate is irregularly irregular.   Pulmonary/Chest: Effort normal and breath sounds normal. No stridor. No respiratory distress. He has no wheezes. He has no rales. He exhibits no tenderness.   Abdominal: Soft. Bowel sounds are normal. He exhibits no distension and no mass. There is no tenderness. There is no rebound and no guarding. No hernia.   Musculoskeletal: Normal range of motion. He exhibits no edema, tenderness or deformity.   Lymphadenopathy:     He has no cervical adenopathy.   Neurological: No cranial nerve deficit or sensory deficit. He exhibits normal muscle tone. Coordination normal.   The patient is not oriented to place or situation.  The patient's not able to answer questions.  He is  however, more appropriate today than he was yesterday.  The patient's sitter is gone and there have not been any behavioral issues today.   Skin: Skin is warm and dry. No rash noted. He is not diaphoretic. No erythema.   Psychiatric: He has a normal mood and affect. His behavior is normal.   Nursing note and vitals reviewed.       Wounds (last 24 hours)      LDA Wound     Row Name 06/07/20 1611 06/07/20 1212 06/07/20 0811       Wound 06/05/20 Bilateral medial coccyx Pressure Injury    Wound - Properties Group Date first assessed: 06/05/20  -DE Present on Hospital Admission: Y  -DE Side: Bilateral  -DE Orientation: medial  -DE Location: coccyx  -DE Primary Wound Type: Pressure inj  -DE Stage, Pressure Injury: deep tissue injury;unstageable  -DE    Dressing Appearance  dry;intact  -RB  dry;intact  -RB  dry;intact  -RB       Wound 06/05/20 1327 scalp Laceration    Wound - Properties Group Date first assessed: 06/05/20  -KT Time first assessed: 1327  -KT Present on Hospital Admission: Y  -KT Location: scalp  -KT Primary Wound Type: Laceration  -KT    Dressing Appearance  open to air  -RB  open to air  -RB  open to air  -RB       Wound 06/05/20 1453 Left lower;posterior;proximal arm Abrasion    Wound - Properties Group Date first assessed: 06/05/20  -KT Time first assessed: 1453  -KT Present on Hospital Admission: Y  -KT Side: Left  -KT Orientation: lower;posterior;proximal  -KT Location: arm  -KT Primary Wound Type: Abrasion  -KT    Dressing Appearance  dry;intact  -RB  dry;intact  -RB  dry;intact  -RB       Wound 06/05/20 1459 Left distal;lower;posterior arm Abrasion    Wound - Properties Group Date first assessed: 06/05/20  -KT Time first assessed: 1459  -KT Present on Hospital Admission: Y  -KT Side: Left  -KT Orientation: distal;lower;posterior  -KT Location: arm  -KT Primary Wound Type: Abrasion  -KT    Dressing Appearance  dry;intact  -RB  dry;intact  -RB  dry;intact  -RB       Wound 06/05/20 1503 Right  distal;lower;posterior arm    Wound - Properties Group Date first assessed: 06/05/20  -KT Time first assessed: 1503  -KT Present on Hospital Admission: Y  -KT Side: Right  -KT Orientation: distal;lower;posterior  -KT Location: arm  -KT    Dressing Appearance  dry;intact  -RB  dry;intact  -RB  dry;intact  -RB       Wound 06/05/20 1847 Left posterior hand Skin Tear    Wound - Properties Group Date first assessed: 06/05/20  -KT Time first assessed: 1847  -KT Present on Hospital Admission: Y  -KT Side: Left  -KT Orientation: posterior  -KT Location: hand  -KT Primary Wound Type: Skin tear  -KT    Dressing Appearance  open to air;no drainage  -RB  open to air;no drainage  -RB  open to air;no drainage  -RB       Wound 06/05/20 1855 thoracic spine    Wound - Properties Group Date first assessed: 06/05/20  -KT Time first assessed: 1855  -KT Present on Hospital Admission: Y  -KT Location: thoracic spine  -KT Stage, Pressure Injury: Stage 1  -KT    Dressing Appearance  dry;intact  -RB  dry;intact  -RB  dry;intact  -RB    Closure  EARL  -RB  EARL  -RB  EARL  -RB       Wound 06/05/20 1940 thoracic spine    Wound - Properties Group Date first assessed: 06/05/20  -KT, Picture with stage 1 picture  Time first assessed: 1940  -KT Present on Hospital Admission: Y  -KT Location: thoracic spine  -KT Stage, Pressure Injury: Stage 2  -KT    Dressing Appearance  dry;intact  -RB  dry;intact  -RB  dry;intact  -RB       Wound 06/06/20 0251 Left anterior knee Abrasion    Wound - Properties Group Date first assessed: 06/06/20  -KW Time first assessed: 0251  -KW Side: Left  -KW Orientation: anterior  -KW Location: knee  -KW Primary Wound Type: Abrasion  -KW    Dressing Appearance  open to air  -RB  open to air  -RB  open to air  -RB    Row Name 06/07/20 0000 06/06/20 2000          Wound 06/05/20 Bilateral medial coccyx Pressure Injury    Wound - Properties Group Date first assessed: 06/05/20  -DE Present on Hospital Admission: Y  -DE Side:  Bilateral  -DE Orientation: medial  -DE Location: coccyx  -DE Primary Wound Type: Pressure inj  -DE Stage, Pressure Injury: deep tissue injury;unstageable  -DE    Dressing Appearance  dry;intact  -JR  dry;intact  -JR        Wound 06/05/20 1327 scalp Laceration    Wound - Properties Group Date first assessed: 06/05/20  -KT Time first assessed: 1327  -KT Present on Hospital Admission: Y  -KT Location: scalp  -KT Primary Wound Type: Laceration  -KT    Dressing Appearance  open to air  -JR  open to air  -JR        Wound 06/05/20 1453 Left lower;posterior;proximal arm Abrasion    Wound - Properties Group Date first assessed: 06/05/20  -KT Time first assessed: 1453  -KT Present on Hospital Admission: Y  -KT Side: Left  -KT Orientation: lower;posterior;proximal  -KT Location: arm  -KT Primary Wound Type: Abrasion  -KT    Dressing Appearance  dry;intact  -JR  dry;intact  -JR        Wound 06/05/20 1459 Left distal;lower;posterior arm Abrasion    Wound - Properties Group Date first assessed: 06/05/20  -KT Time first assessed: 1459  -KT Present on Hospital Admission: Y  -KT Side: Left  -KT Orientation: distal;lower;posterior  -KT Location: arm  -KT Primary Wound Type: Abrasion  -KT    Dressing Appearance  dry;intact  -JR  dry;intact  -JR        Wound 06/05/20 1503 Right distal;lower;posterior arm    Wound - Properties Group Date first assessed: 06/05/20  -KT Time first assessed: 1503  -KT Present on Hospital Admission: Y  -KT Side: Right  -KT Orientation: distal;lower;posterior  -KT Location: arm  -KT    Dressing Appearance  dry;intact  -JR  dry;intact  -JR        Wound 06/05/20 1847 Left posterior hand Skin Tear    Wound - Properties Group Date first assessed: 06/05/20  -KT Time first assessed: 1847  -KT Present on Hospital Admission: Y  -KT Side: Left  -KT Orientation: posterior  -KT Location: hand  -KT Primary Wound Type: Skin tear  -KT    Dressing Appearance  open to air;no drainage  -JR  open to air;no drainage  -JR           Wound 06/05/20 1855 thoracic spine    Wound - Properties Group Date first assessed: 06/05/20  -KT Time first assessed: 1855  -KT Present on Hospital Admission: Y  -KT Location: thoracic spine  -KT Stage, Pressure Injury: Stage 1  -KT    Dressing Appearance  dry;intact  -JR  dry;intact  -JR     Closure  EARL  -JR  EARL  -JR        Wound 06/05/20 1940 thoracic spine    Wound - Properties Group Date first assessed: 06/05/20  -KT, Picture with stage 1 picture  Time first assessed: 1940  -KT Present on Hospital Admission: Y  -KT Location: thoracic spine  -KT Stage, Pressure Injury: Stage 2  -KT       Wound 06/06/20 0251 Left anterior knee Abrasion    Wound - Properties Group Date first assessed: 06/06/20  -KW Time first assessed: 0251  -KW Side: Left  -KW Orientation: anterior  -KW Location: knee  -KW Primary Wound Type: Abrasion  -KW      User Key  (r) = Recorded By, (t) = Taken By, (c) = Cosigned By    Initials Name Provider Type    Costa Mehta, RN Registered Nurse    Keith Ramirez LPN Licensed Nurse    Yasir Lucas RN Registered Nurse    Geraldine Haney LPN Licensed Nurse    Renita Ya, RN Registered Nurse        Procedures:    Results Review:     I reviewed the patient's new clinical results.    Lab Results (last 24 hours)     Procedure Component Value Units Date/Time    COVID-19, [facility]-HOUSE, NP SWAB IN TRANSPORT MEDIA 8-12 HR TAT - Swab, Nasopharynx [[id number]] Collected:  06/07/20 0955    Specimen:  Swab from Nasopharynx Updated:  06/07/20 1024    BUN [[id number]]  (Normal) Collected:  06/07/20 0439    Specimen:  Blood Updated:  06/07/20 0734     BUN 11 mg/dL     Basic Metabolic Panel [[id number]]  (Abnormal) Collected:  06/07/20 0439    Specimen:  Blood Updated:  06/07/20 0532     Glucose 105 mg/dL      BUN --     Comment: Testing performed by alternate method        Creatinine 0.57 mg/dL      Sodium 138 mmol/L      Potassium 3.9 mmol/L      Chloride 103 mmol/L      CO2 28.0  mmol/L      Calcium 8.7 mg/dL      eGFR Non African Amer 135 mL/min/1.73      BUN/Creatinine Ratio --     Comment: Testing not performed.        Anion Gap 7.0 mmol/L     Narrative:       GFR Normal >60  Chronic Kidney Disease <60  Kidney Failure <15      CBC Auto Differential [[id number]]  (Abnormal) Collected:  06/07/20 0439    Specimen:  Blood Updated:  06/07/20 0518     WBC 6.00 10*3/mm3      RBC 3.25 10*6/mm3      Hemoglobin 10.2 g/dL      Hematocrit 30.9 %      MCV 95.1 fL      MCH 31.3 pg      MCHC 32.9 g/dL      RDW 18.4 %      RDW-SD 61.3 fl      MPV 10.2 fL      Platelets 90 10*3/mm3      Neutrophil % 59.2 %      Lymphocyte % 20.9 %      Monocyte % 19.5 %      Eosinophil % 0.2 %      Basophil % 0.2 %      Neutrophils, Absolute 3.50 10*3/mm3      Lymphocytes, Absolute 1.20 10*3/mm3      Monocytes, Absolute 1.20 10*3/mm3      Eosinophils, Absolute 0.00 10*3/mm3      Basophils, Absolute 0.00 10*3/mm3      nRBC 0.2 /100 WBC         No results found for: HGBA1C  Results from last 7 days   Lab Units 06/05/20  0044   INR  1.12*         No results found for: LIPASE  Lab Results   Component Value Date    CHOL 138 09/21/2018    TRIG 55 09/21/2018    HDL 71 09/21/2018    LDL 44 09/21/2018     No results found for: INTRAOP, PREDX, FINALDX, COMDX    Microbiology Results (last 10 days)     Procedure Component Value - Date/Time    COVID-19,[facility], NP SWAB IN TRANSPORT MEDIA 8-12 HR TAT - Swab, Nasopharynx [[id number]]  (Normal) Collected:  06/06/20 0921    Lab Status:  Final result Specimen:  Swab from Nasopharynx Updated:  06/06/20 1308     COVID19 Not Detected        ECG/EMG Results (most recent)     Procedure Component Value Units Date/Time    Adult Transthoracic Echo Complete With Contrast if Necessary Per Protocol (With Agitated Saline) [[id number]] Collected:  06/05/20 0801     Updated:  06/05/20 1113     BSA 1.7 m^2      RVIDd 3.6 cm      IVSd 1.1 cm      IVSs 1.7 cm      LVIDd 4.6 cm      LVIDs 2.7 cm       LVPWd 1.1 cm       CV ECHO DEBORAH - LVPWS 1.4 cm      IVS/LVPW 0.98     FS 41.3 %      EDV(Teich) 95.4 ml      ESV(Teich) 26.5 ml      EF(Teich) 72.3 %      EDV(cubed) 94.9 ml      ESV(cubed) 19.2 ml      EF(cubed) 79.8 %      % IVS thick 49.4 %      % LVPW thick 25.9 %      LV mass(C)d 183.2 grams      LV mass(C)dI 107.8 grams/m^2      LV mass(C)s 143.3 grams      LV mass(C)sI 84.3 grams/m^2      SV(Teich) 69.0 ml      SI(Teich) 40.6 ml/m^2      SV(cubed) 75.7 ml      SI(cubed) 44.6 ml/m^2      Ao root diam 3.5 cm      Ao root area 9.7 cm^2      ACS 2.0 cm      LVOT diam 2.3 cm      LVOT area 4.2 cm^2      EDV(MOD-sp4) 68.3 ml      ESV(MOD-sp4) 26.5 ml      EF(MOD-sp4) 61.2 %      SV(MOD-sp4) 41.8 ml      SI(MOD-sp4) 24.6 ml/m^2      Ao root area (BSA corrected) 2.1     LV Bermudez Vol (BSA corrected) 40.2 ml/m^2      LV Sys Vol (BSA corrected) 15.6 ml/m^2      Aortic R-R 0.23 sec      Aortic .8 BPM      MV E max mike 76.5 cm/sec      MV A max mike 104.0 cm/sec      MV E/A 0.74     MV V2 max 104.5 cm/sec      MV max PG 4.4 mmHg      MV V2 mean 60.6 cm/sec      MV mean PG 1.7 mmHg      MV V2 VTI 17.6 cm      MVA(VTI) 3.3 cm^2      MV dec slope 355.8 cm/sec^2      MV dec time 0.22 sec      Ao pk mike 96.4 cm/sec      Ao max PG 3.7 mmHg      Ao max PG (full) 1.4 mmHg      Ao V2 mean 73.9 cm/sec      Ao mean PG 2.3 mmHg      Ao mean PG (full) 0.97 mmHg      Ao V2 VTI 16.9 cm      RUTHANN(I,A) 3.5 cm^2      RUTHANN(I,D) 3.5 cm^2      RUTHANN(V,A) 3.3 cm^2      RUTHANN(V,D) 3.3 cm^2      LV V1 max PG 2.4 mmHg      LV V1 mean PG 1.4 mmHg      LV V1 max 76.8 cm/sec      LV V1 mean 54.4 cm/sec      LV V1 VTI 14.1 cm      CO(Ao) 43.1 l/min      CI(Ao) 25.4 l/min/m^2      SV(Ao) 164.1 ml      SI(Ao) 96.5 ml/m^2      CO(LVOT) 15.4 l/min      CI(LVOT) 9.1 l/min/m^2      SV(LVOT) 58.7 ml      SI(LVOT) 34.5 ml/m^2      PA V2 max 80.4 cm/sec      PA max PG 2.6 mmHg      PA max PG (full) 0.7 mmHg      PA V2 mean 57.5 cm/sec      PA mean PG 1.5 mmHg       PA mean PG (full) 0.54 mmHg      PA V2 VTI 17.1 cm      PA acc time 0.11 sec      RV V1 max PG 1.9 mmHg      RV V1 mean PG 0.91 mmHg      RV V1 max 68.6 cm/sec      RV V1 mean 44.5 cm/sec      RV V1 VTI 12.8 cm      TR max mike 248.0 cm/sec      RVSP(TR) 27.6 mmHg      RAP systole 3.0 mmHg      PA pr(Accel) 27.6 mmHg       CV ECHO DEBORAH - BZI_BMI 29.5 kilograms/m^2       CV ECHO DEBORAH - BSA(HAYCOCK) 1.8 m^2       CV ECHO DEBORAH - BZI_METRIC_WEIGHT 70.8 kg       CV ECHO DEBORAH - BZI_METRIC_HEIGHT 154.9 cm      Target HR (85%) 113 bpm      Max. Pred. HR (100%) 133 bpm      EF(MOD-bp) 72.0 %      LA dimension(2D) 3.6 cm     Narrative:         · Left ventricular wall thickness is consistent with mild concentric   hypertrophy.  · Left ventricular systolic function is normal.  · Left ventricular diastolic dysfunction (grade I a) consistent with   impaired relaxation.  · Mild tricuspid valve regurgitation is present.     Normal LV and RV size and function  Mild concentric LVH  Grade 1 diastolic dysfunction by criteria  Normal atrial sizes  Normal IVC caliber in diameter and collapsibility with normal right sided   pressures estimated, no significant pulmonary hypertension seen  EF estimated 65 to 70%  Mild TR, trace to mild MR, trace AI  No significant valvular stenoses or elevated gradients seen  No masses  No pericardial effusion seen      ECG 12 Lead [[id number]] Collected:  06/05/20 0040     Updated:  06/06/20 0936    Narrative:       HEART RATE= 82  bpm  RR Interval= 728  ms  MS Interval= 210  ms  P Horizontal Axis=   deg  P Front Axis= 79  deg  QRSD Interval= 88  ms  QT Interval= 344  ms  QRS Axis= 62  deg  T Wave Axis= 74  deg  - BORDERLINE ECG -  Sinus rhythm  Low voltage with right axis deviation  When compared with ECG of 20-Nov-2019 9:50:58,  Significant rate increase  Significant axis, voltage or hypertrophy change  Electronically Signed By: Mike Bravo ([facility]) 06-Jun-2020 09:28:14  Date and Time of Study:  2020-06-05 00:40:51    ECG 12 Lead [[id number]] Collected:  06/06/20 0552     Updated:  06/07/20 1053    Narrative:       HEART RATE= 72  bpm  RR Interval= 828  ms  AR Interval= 203  ms  P Horizontal Axis= -44  deg  P Front Axis= 70  deg  QRSD Interval= 95  ms  QT Interval= 373  ms  QRS Axis= 33  deg  T Wave Axis= 43  deg  - ABNORMAL ECG -  Sinus rhythm  Anterior infarct, old  When compared with ECG of 05-Jun-2020 0:40:51,  Significant axis, voltage or hypertrophy change  Electronically Signed By: Greg Chen ([facility]) 07-Jun-2020 10:39:15  Date and Time of Study: 2020-06-06 05:52:02        Results for orders placed during the hospital encounter of 06/05/20   Bilateral Carotid Duplex    Narrative · Proximal right internal carotid artery plaque without significant   stenosis.  · Proximal left internal carotid artery plaque without significant   stenosis.          Results for orders placed during the hospital encounter of 06/05/20   Adult Transthoracic Echo Complete With Contrast if Necessary Per Protocol (With Agitated Saline)    Narrative · Left ventricular wall thickness is consistent with mild concentric   hypertrophy.  · Left ventricular systolic function is normal.  · Left ventricular diastolic dysfunction (grade I a) consistent with   impaired relaxation.  · Mild tricuspid valve regurgitation is present.     Normal LV and RV size and function  Mild concentric LVH  Grade 1 diastolic dysfunction by criteria  Normal atrial sizes  Normal IVC caliber in diameter and collapsibility with normal right sided   pressures estimated, no significant pulmonary hypertension seen  EF estimated 65 to 70%  Mild TR, trace to mild MR, trace AI  No significant valvular stenoses or elevated gradients seen  No masses  No pericardial effusion seen       Ct Head Without Contrast    Result Date: 6/4/2020  1. No acute intracranial process. Frontal scalp soft tissue swelling/hematoma. 2. Mild changes small vessel ischemic disease of  indeterminate age, presumably mostly chronic. Volume loss. Atherosclerosis. 3. Left mastoid effusion. Paranasal sinus disease.  Electronically signed by:  Jairon Sykes M.D.  6/4/2020 11:32 PM    Ct Cervical Spine Without Contrast    Result Date: 6/4/2020  1. No acute osseous abnormality. Reversal normal lordotic curvature. Osteopenia. Multilevel degenerative changes. 2. Small to moderate left pleural effusion with atelectasis.  Electronically signed by:  Jairon Sykes M.D.  6/4/2020 11:38 PM    Xr Chest 1 View    Result Date: 6/5/2020  1. Left retrocardiac airspace opacity likely representing left lower lobe atelectasis. 2. Small left-sided pleural effusion.  Electronically Signed ByRafael Caballero On:6/5/2020 7:39 AM This report was finalized on [id number] by  Fabiano Caballero .    Xr Pelvis 1 Or 2 View    Result Date: 6/5/2020  1. No acute fracture or dislocation. 2. Severe left hip osteoarthritis  Electronically Signed ByRafael Caballero On:6/5/2020 7:41 AM This report was finalized on [id number] by  Fabiano Caballero .    Xrays, labs reviewed personally by physician.    Medication Review:   I have reviewed the patient's current medication list    Scheduled Meds    albuterol sulfate HFA 2 puff Inhalation Q4H - RT   aspirin 81 mg Oral Daily   atorvastatin 20 mg Oral Nightly   clopidogrel 75 mg Oral Daily   collagenase  Topical Q24H   digoxin 250 mcg Oral Daily   divalproex 500 mg Oral BID   donepezil 20 mg Oral Nightly   escitalopram 5 mg Oral Daily   furosemide 40 mg Oral Daily   guaiFENesin 1,200 mg Oral Q12H   isosorbide mononitrate 30 mg Oral Daily   LORazepam 0.5 mg Oral TID   memantine 10 mg Oral BID   potassium chloride 20 mEq Oral Daily   sodium chloride 10 mL Intravenous Q12H     Meds Infusions     Meds PRN  •  acetaminophen  •  aluminum-magnesium hydroxide-simethicone  •  magnesium hydroxide  •  melatonin  •  morphine sulfate (concentrate)  •  nitroglycerin  •  ondansetron **OR**  ondansetron  •  sodium chloride    Assessment/Plan   Assessment/Plan     Active Hospital Problems    Diagnosis  POA   • **Syncope and collapse [R55]  Yes   • Hyperlipidemia [E78.5]  Yes   • Elevated random blood glucose level [R73.09]  Yes   • Seizure disorder (CMS/[facility]) [G40.909]  Yes   • Paroxysmal atrial fibrillation (CMS/[facility]) [I48.0]  Yes   • Hypertension [I10]  Yes   • Gastroesophageal reflux disease [K21.9]  Yes   • Dementia (CMS/[facility]) [F03.90]  Yes   • Chronic obstructive lung disease (CMS/[facility]) [J44.9]  Yes   • Anemia [D64.9]  Yes      Resolved Hospital Problems   No resolved problems to display.       MEDICAL DECISION MAKING COMPLEXITY BY PROBLEM:   1.  Syncope   -Patient ruled out for myocardial infarction  - carotid Doppler showed no areas of stenosis  -CT scan showed no evidence of cervical spine or brain injury/stroke/etiology for the syncope.  -Echocardiogram with no evidence of mural thrombus or abnormality  -Suspect that this may have been a mechanical fall rather than a true syncopal episode    2.  Elevation of glucose  -These are only mildly elevated will follow    3.  COPD not currently exacerbated  -Continue breathing treatments    4.  Hypertension  -Continue Lasix as per home dosing    5.  Coronary artery disease  -Continue Imdur    6.  Atrial fibrillation-chronic  -Continue Plavix aspirin and Lanoxin    7.  Anemia of chronic disease  -Stable    8.  Seizure disorder  -Continue Depakote  -Valproic acid level therapeutic    9.  Hyperlipidemia  -Continue statin therapy    10.  Gastroesophageal reflux disease  -Continue Aricept, Lexapro, Namenda    11.  History of COVID 19  -Patient apparently was +2 weeks ago but this information was not given to the hospital by the referring nursing home which was [facility]       -The patient was transferred to the COVID determination bolanos as soon as the hospital found this information.       -The patient has now tested negative       -The patient apparently requires  a second test before he can be taken out of the COVID evaluation unit as he was positive 2 weeks ago    The patient will likely be discharged back to the nursing home tomorrow if he remains without a sitter and without incident and is negative for COVID..    VTE Prophylaxis -   Mechanical Order History:      Ordered        06/05/20 0423  Place Sequential Compression Device  Once         06/05/20 0423  Maintain Sequential Compression Device  Continuous                 Pharmalogical Order History:     None        Code Status -   Code Status and Medical Interventions:   Ordered at: 06/05/20 0423     Code Status:    CPR     Medical Interventions (Level of Support Prior to Arrest):    Full     This patient has been examined wearing appropriate Personal Protective Equipment and discussed with hospital infection control department. 06/07/20    Discharge Planning    Destination - Selection Complete      Service Provider Request Status Selected Services Address Phone Number Fax Number    [facility] [place] [facility]illed Nursing [address] [place] [id number] [phone]      Durable Medical Equipment      Coordination has not been started for this encounter.      Dialysis/Infusion      Coordination has not been started for this encounter.      Home Medical Care      Service Provider Request Status Selected Services Address Phone Number Fax Number    [facility] [place] [facility] [address] [place] [id number] [phone] --      Therapy      Coordination has not been started for this encounter.      Community Resources      Coordination has not been started for this encounter.        Electronically signed by Addie Jarquin MD, 06/07/20, 17:24.  [facility]ist [facility]

## 2020-06-07 NOTE — PLAN OF CARE
Problem: Patient Care Overview  Goal: Plan of Care Review  Outcome: Ongoing (interventions implemented as appropriate)  Flowsheets (Taken 6/7/2020 0338)  Progress: no change  Plan of Care Reviewed With: patient  Goal: Individualization and Mutuality  Outcome: Ongoing (interventions implemented as appropriate)  Goal: Discharge Needs Assessment  Outcome: Ongoing (interventions implemented as appropriate)  Goal: Interprofessional Rounds/Family Conf  Outcome: Ongoing (interventions implemented as appropriate)     Problem: Fall Risk (Adult)  Goal: Identify Related Risk Factors and Signs and Symptoms  Outcome: Ongoing (interventions implemented as appropriate)  Flowsheets (Taken 6/7/2020 0338)  Related Risk Factors (Fall Risk): age-related changes; history of falls; sensory deficits; environment unfamiliar; sleep pattern alteration; fatigue/slow reaction; confusion/agitation  Signs and Symptoms (Fall Risk): presence of risk factors  Goal: Absence of Fall  Outcome: Ongoing (interventions implemented as appropriate)  Flowsheets (Taken 6/7/2020 0338)  Absence of Fall: achieves outcome     Problem: Skin Injury Risk (Adult)  Goal: Identify Related Risk Factors and Signs and Symptoms  Outcome: Ongoing (interventions implemented as appropriate)  Flowsheets (Taken 6/7/2020 0338)  Related Risk Factors (Skin Injury Risk): advanced age; cognitive impairment  Goal: Skin Health and Integrity  Outcome: Ongoing (interventions implemented as appropriate)  Flowsheets (Taken 6/7/2020 0338)  Skin Health and Integrity: achieves outcome     Problem: Syncope (Adult)  Goal: Identify Related Risk Factors and Signs and Symptoms  Outcome: Ongoing (interventions implemented as appropriate)  Goal: Physical Safety/Health Maintenance  Outcome: Ongoing (interventions implemented as appropriate)  Goal: Optimal Emotional/Functional [place]  Outcome: Ongoing (interventions implemented as appropriate)     Problem: Activity Intolerance (Adult)  Goal:  Identify Related Risk Factors and Signs and Symptoms  Outcome: Ongoing (interventions implemented as appropriate)  Goal: Activity Tolerance  Outcome: Ongoing (interventions implemented as appropriate)  Goal: Effective Energy Conservation Techniques  Outcome: Ongoing (interventions implemented as appropriate)

## 2020-06-08 VITALS
RESPIRATION RATE: 18 BRPM | WEIGHT: 156.09 LBS | TEMPERATURE: 98.1 F | OXYGEN SATURATION: 95 % | DIASTOLIC BLOOD PRESSURE: 60 MMHG | HEART RATE: 85 BPM | HEIGHT: 65 IN | SYSTOLIC BLOOD PRESSURE: 123 MMHG | BODY MASS INDEX: 26.01 KG/M2

## 2020-06-08 PROBLEM — U07.1 COVID-19 VIRUS DETECTED: Status: ACTIVE | Noted: 2020-06-08

## 2020-06-08 PROBLEM — Z79.899 POLYPHARMACY: Chronic | Status: ACTIVE | Noted: 2020-06-08

## 2020-06-08 PROBLEM — E11.9 DIABETES MELLITUS (HCC): Chronic | Status: ACTIVE | Noted: 2020-06-08

## 2020-06-08 PROBLEM — F11.20 CHRONIC NARCOTIC DEPENDENCE (HCC): Chronic | Status: ACTIVE | Noted: 2020-06-08

## 2020-06-08 LAB
ANION GAP SERPL CALCULATED.3IONS-SCNC: 12 MMOL/L (ref 5–15)
BASOPHILS # BLD AUTO: 0.1 10*3/MM3 (ref 0–0.2)
BASOPHILS NFR BLD AUTO: 0.6 % (ref 0–1.5)
BUN BLD-MCNC: 11 MG/DL (ref 8–23)
BUN BLD-MCNC: ABNORMAL MG/DL
BUN/CREAT SERPL: ABNORMAL
CALCIUM SPEC-SCNC: 8.9 MG/DL (ref 8.6–10.5)
CHLORIDE SERPL-SCNC: 99 MMOL/L (ref 98–107)
CO2 SERPL-SCNC: 26 MMOL/L (ref 22–29)
CREAT BLD-MCNC: 0.59 MG/DL (ref 0.76–1.27)
DEPRECATED RDW RBC AUTO: 65.2 FL (ref 37–54)
EOSINOPHIL # BLD AUTO: 0 10*3/MM3 (ref 0–0.4)
EOSINOPHIL NFR BLD AUTO: 0.2 % (ref 0.3–6.2)
ERYTHROCYTE [DISTWIDTH] IN BLOOD BY AUTOMATED COUNT: 19.2 % (ref 12.3–15.4)
GFR SERPL CREATININE-BSD FRML MDRD: 130 ML/MIN/1.73
GLUCOSE BLD-MCNC: 102 MG/DL (ref 65–99)
HBA1C MFR BLD: 5.4 % (ref 3.5–5.6)
HCT VFR BLD AUTO: 31.5 % (ref 37.5–51)
HGB BLD-MCNC: 10.4 G/DL (ref 13–17.7)
LYMPHOCYTES # BLD AUTO: 1.8 10*3/MM3 (ref 0.7–3.1)
LYMPHOCYTES NFR BLD AUTO: 21.5 % (ref 19.6–45.3)
MCH RBC QN AUTO: 31.7 PG (ref 26.6–33)
MCHC RBC AUTO-ENTMCNC: 32.9 G/DL (ref 31.5–35.7)
MCV RBC AUTO: 96.5 FL (ref 79–97)
MONOCYTES # BLD AUTO: 1.5 10*3/MM3 (ref 0.1–0.9)
MONOCYTES NFR BLD AUTO: 18 % (ref 5–12)
NEUTROPHILS # BLD AUTO: 4.9 10*3/MM3 (ref 1.7–7)
NEUTROPHILS NFR BLD AUTO: 59.7 % (ref 42.7–76)
NRBC BLD AUTO-RTO: 0.1 /100 WBC (ref 0–0.2)
PLATELET # BLD AUTO: 96 10*3/MM3 (ref 140–450)
PMV BLD AUTO: 10.3 FL (ref 6–12)
POTASSIUM BLD-SCNC: 3.9 MMOL/L (ref 3.5–5.2)
RBC # BLD AUTO: 3.27 10*6/MM3 (ref 4.14–5.8)
SODIUM BLD-SCNC: 137 MMOL/L (ref 136–145)
WBC NRBC COR # BLD: 8.2 10*3/MM3 (ref 3.4–10.8)

## 2020-06-08 PROCEDURE — 80048 BASIC METABOLIC PNL TOTAL CA: CPT | Performed by: STUDENT IN AN ORGANIZED HEALTH CARE EDUCATION/TRAINING PROGRAM

## 2020-06-08 PROCEDURE — 94799 UNLISTED PULMONARY SVC/PX: CPT

## 2020-06-08 PROCEDURE — 85025 COMPLETE CBC W/AUTO DIFF WBC: CPT | Performed by: STUDENT IN AN ORGANIZED HEALTH CARE EDUCATION/TRAINING PROGRAM

## 2020-06-08 PROCEDURE — 99217 PR OBSERVATION CARE DISCHARGE MANAGEMENT: CPT | Performed by: INTERNAL MEDICINE

## 2020-06-08 PROCEDURE — G0378 HOSPITAL OBSERVATION PER HR: HCPCS

## 2020-06-08 RX ORDER — MORPHINE SULFATE 10 MG/.5ML
5 SOLUTION ORAL EVERY 6 HOURS PRN
Qty: 180 ML
Start: 2020-06-08

## 2020-06-08 RX ADMIN — GUAIFENESIN 1200 MG: 600 TABLET, EXTENDED RELEASE ORAL at 09:12

## 2020-06-08 RX ADMIN — ISOSORBIDE MONONITRATE 30 MG: 30 TABLET, EXTENDED RELEASE ORAL at 09:12

## 2020-06-08 RX ADMIN — ALBUTEROL SULFATE 2 PUFF: 90 AEROSOL, METERED RESPIRATORY (INHALATION) at 11:04

## 2020-06-08 RX ADMIN — FUROSEMIDE 40 MG: 40 TABLET ORAL at 09:12

## 2020-06-08 RX ADMIN — DIVALPROEX SODIUM 500 MG: 500 TABLET, DELAYED RELEASE ORAL at 09:12

## 2020-06-08 RX ADMIN — COLLAGENASE SANTYL: 250 OINTMENT TOPICAL at 09:14

## 2020-06-08 RX ADMIN — Medication 10 ML: at 09:13

## 2020-06-08 RX ADMIN — CLOPIDOGREL BISULFATE 75 MG: 75 TABLET ORAL at 09:12

## 2020-06-08 RX ADMIN — ESCITALOPRAM OXALATE 5 MG: 10 TABLET ORAL at 09:13

## 2020-06-08 RX ADMIN — ALBUTEROL SULFATE 2 PUFF: 90 AEROSOL, METERED RESPIRATORY (INHALATION) at 08:00

## 2020-06-08 RX ADMIN — POTASSIUM CHLORIDE 20 MEQ: 1500 TABLET, EXTENDED RELEASE ORAL at 09:12

## 2020-06-08 RX ADMIN — DIGOXIN 250 MCG: 250 TABLET ORAL at 11:33

## 2020-06-08 RX ADMIN — MEMANTINE 10 MG: 10 TABLET ORAL at 09:13

## 2020-06-08 RX ADMIN — ASPIRIN 81 MG: 81 TABLET, COATED ORAL at 09:12

## 2020-06-08 RX ADMIN — LORAZEPAM 0.5 MG: 0.5 TABLET ORAL at 09:12

## 2020-06-08 RX ADMIN — LORAZEPAM 0.5 MG: 0.5 TABLET ORAL at 15:03

## 2020-06-08 NOTE — PROGRESS NOTES
Case Management/Social Work    Patient Name:  Nathalia Carmona  YOB: 1933  MRN: [id number]  Admit Date:  6/5/2020        SW made to negative phone calls to pt DARBY Victor to inform her that pt can return back to [facility] once discharge readyand received a busy signal both times. Will try calling back this afternoon.       Electronically signed by:  Daija Tong  06/08/20 10:45

## 2020-06-08 NOTE — PROGRESS NOTES
Continued Stay Note  [facility]     Patient Name: Nathalia Carmona  MRN: [id number]  Today's Date: 6/8/2020    Admit Date: 6/5/2020    Discharge Plan     Row Name 06/08/20 1232       Plan    Plan  DC Plan: Pt to return to [facility] with [facility]. No PASRR or precert required. Need to confirm with POA    Plan Comments  GRIFFIN spoke with liasion and was informed that pt can return to [facility] at discharge without having two covid negative test. Liasion reported they are reaching out to family. GRIFFIN has made two additional attempts and continues to get a busy signal.        Mariely PARISI   Cell: [phone]  Office: [phone]  Fax: [phone]

## 2020-06-08 NOTE — PLAN OF CARE
Problem: Patient Care Overview  Goal: Plan of Care Review  Outcome: Ongoing (interventions implemented as appropriate)  Flowsheets (Taken 6/8/2020 6009)  Outcome Summary: Pt remains confused. Pt impulsive with getting up and uses foul language often. Pt was supposed to d/c back to [facility] today - however, pt must have 2 negative COVID tests and his 2nd test came back postive. Will  continue to monitor

## 2020-06-08 NOTE — DISCHARGE SUMMARY
Date of Admission: 6/5/2020 203/1    Date of Discharge:  6/8/2020    Length of stay:  LOS: 0 days     Patient was examined with relevant and adequate PPE keeping in mind the current coronavirus pandemic.      Presenting Problem/History of Present Illness   Present on Admission:  • Syncope and collapse  • Essential hypertension  • Dementia (CMS/HCC)  • Hyperlipidemia  • Chronic obstructive lung disease (CMS/HCC)  • Paroxysmal atrial fibrillation (CMS/HCC)  • Anemia  • Seizure disorder (CMS/HCC)  • Diabetes mellitus (CMS/HCC)  • Cardiac pacemaker in situ  • Chronic narcotic dependence (CMS/HCC)  • COVID-19 virus detected        Hospital Course    Chief Complaint   Patient presents with   • Head Injury       Nathalia Carmona 87 y.o. male.      The patient is an 87-year-old male who was admitted for syncope and collapse.  The patient has had numerous tests all of which have been unremarkable.  The patient apparently had COVID-19 2 weeks prior to admission that was not disclosed to the hospital by the referring facility.  The patient has been moved to a COVID isolation bolanos.  His initial test was negative here but I am told that he will have to have a second test prior to discharge from that unit.  The patient is nearing maximal benefit of this hospital stay.     6/7/2020  Today the patient is better by report of nursing.  Apparently he is now able to press his call button for them to come and help him go to the bathroom.  He has had a sitter since admission, however today we have been able to remove the sitter and he has been doing well without one.  The patient's first COVID test was negative.  Apparently there has to be a second one prior to moving him from the COVID unit.    Extensive work-up while in the hospital negative.  His syncope/falls are due to his chronic narcotic dependence and concomitant benzodiazepine use.  Combination is especially contraindicated in elderly.  I do not see a legitimate diagnosis  needing high-dose narcotics that he is on.  Defer to the PCP to discontinue them.  I would stop the benzodiazepines.  Patient sedated at my examination.  Patient's COVID test is positive but he has no illness secondary to it.    Review of Systems   Unable to perform ROS: acuity of condition           Family History   Problem Relation Age of Onset   • No Known Problems Mother    • No Known Problems Father         Past Medical History:   Diagnosis Date   • Anemia    • Atrial fibrillation (CMS/HCC)    • COPD (chronic obstructive pulmonary disease) (CMS/HCC)    • Dementia (CMS/HCC)    • Depression    • Diabetes mellitus (CMS/HCC)    • Elevated random blood glucose level 6/5/2020   • Hyperlipidemia    • Hypertension    • Lung cancer (CMS/HCC)    • Seizure disorder (CMS/HCC) 6/5/2020   • Thrombocytopenia (CMS/HCC)        Past Surgical History:   Procedure Laterality Date   • OTHER SURGICAL HISTORY      Loop Recorder        Social History     Socioeconomic History   • Marital status:      Spouse name: Not on file   • Number of children: Not on file   • Years of education: Not on file   • Highest education level: Not on file   Tobacco Use   • Smoking status: Never Smoker   Substance and Sexual Activity   • Alcohol use: No     Frequency: Never     Comment: previous    • Drug use: Never   • Sexual activity: Defer       Vital Signs  Temp:  [97.3 °F (36.3 °C)-98.1 °F (36.7 °C)] 98.1 °F (36.7 °C)  Heart Rate:  [70-93] 85  Resp:  [16-20] 18  BP: (109-123)/(47-63) 123/60  Weight change: 0.2 kg (7.1 oz)    Physical Exam:  Physical Exam   Constitutional: He is oriented to person, place, and time. He appears well-developed. No distress.   Sedated but trying to eat and answer simple questions.   HENT:   Head: Normocephalic and atraumatic.   Right Ear: External ear normal.   Left Ear: External ear normal.   Nose: Nose normal.   Mouth/Throat: Oropharynx is clear and moist. No oropharyngeal exudate.   Eyes: Pupils are equal,  round, and reactive to light. Conjunctivae and EOM are normal. Right eye exhibits no discharge. Left eye exhibits no discharge. No scleral icterus.   Neck: Normal range of motion. No JVD present. No tracheal deviation present. No thyromegaly present.   Cardiovascular: Normal rate, regular rhythm and normal heart sounds. Exam reveals no gallop and no friction rub.   No murmur heard.  Absent pedal pulses  +1 leg edema bilaterally   Pulmonary/Chest: Effort normal and breath sounds normal. No stridor. No respiratory distress. He has no wheezes. He has no rales. He exhibits no tenderness.   Abdominal: Soft. Bowel sounds are normal. He exhibits no distension and no mass. There is no tenderness. There is no rebound and no guarding. No hernia.   Midline large abdominal scar from remote surgery   Musculoskeletal: Normal range of motion. He exhibits edema. He exhibits no tenderness or deformity.   Lymphadenopathy:     He has no cervical adenopathy.   Neurological: He is alert and oriented to person, place, and time. No cranial nerve deficit or sensory deficit. He exhibits normal muscle tone. Coordination normal.   Skin: Skin is warm and dry. No rash noted. He is not diaphoretic. No erythema.   Nursing note and vitals reviewed.             Wounds (last 24 hours)      LDA Wound     Row Name 06/08/20 0739 06/07/20 1955 06/07/20 1611       Wound 06/05/20 Bilateral medial coccyx Pressure Injury    Wound - Properties Group Date first assessed: 06/05/20  -DE Present on Hospital Admission: Y  -DE Side: Bilateral  -DE Orientation: medial  -DE Location: coccyx  -DE Primary Wound Type: Pressure inj  -DE Stage, Pressure Injury: deep tissue injury;unstageable  -DE    Dressing Appearance  dry;intact  -VS  dry;intact  -KK  dry;intact  -RB    Closure  Adhesive bandage;EARL  -VS  Adhesive bandage  -KK  --    Base  dressing in place, unable to visualize  -VS  non-blanchable;red;moist  -KK  --    Edges  --  -VS  open  -KK  --       Wound  06/05/20 1327 scalp Laceration    Wound - Properties Group Date first assessed: 06/05/20  -KT Time first assessed: 1327  -KT Present on Hospital Admission: Y  -KT Location: scalp  -KT Primary Wound Type: Laceration  -KT    Dressing Appearance  open to air  -VS  open to air  -KK  open to air  -RB    Base  dry;scab  -VS  dry;scab  -KK  --    Drainage Amount  none  -VS  none  -KK  --       Wound 06/05/20 1453 Left lower;posterior;proximal arm Abrasion    Wound - Properties Group Date first assessed: 06/05/20  -KT Time first assessed: 1453  -KT Present on Hospital Admission: Y  -KT Side: Left  -KT Orientation: lower;posterior;proximal  -KT Location: arm  -KT Primary Wound Type: Abrasion  -KT    Dressing Appearance  intact;dry  -VS  intact;dry  -KK  dry;intact  -RB    Closure  Adhesive bandage  -VS  Adhesive bandage  -KK  --    Base  dressing in place, unable to visualize  -VS  dressing in place, unable to visualize  -KK  --       Wound 06/05/20 1459 Left distal;lower;posterior arm Abrasion    Wound - Properties Group Date first assessed: 06/05/20  -KT Time first assessed: 1459  -KT Present on Hospital Admission: Y  -KT Side: Left  -KT Orientation: distal;lower;posterior  -KT Location: arm  -KT Primary Wound Type: Abrasion  -KT    Dressing Appearance  dry;intact  -VS  dry;intact  -KK  dry;intact  -RB    Closure  Adhesive bandage;EARL  -VS  --  --    Base  dressing in place, unable to visualize  -VS  dressing in place, unable to visualize  -KK  --       Wound 06/05/20 1503 Right distal;lower;posterior arm    Wound - Properties Group Date first assessed: 06/05/20  -KT Time first assessed: 1503  -KT Present on Hospital Admission: Y  -KT Side: Right  -KT Orientation: distal;lower;posterior  -KT Location: arm  -KT    Dressing Appearance  dry;intact  -VS  dry;intact  -KK  dry;intact  -RB    Closure  Adhesive bandage  -VS  Adhesive bandage  -KK  --    Base  dressing in place, unable to visualize  -VS  dressing in place, unable  to visualize  -KK  --       Wound 06/05/20 1847 Left posterior hand Skin Tear    Wound - Properties Group Date first assessed: 06/05/20  -KT Time first assessed: 1847  -KT Present on Hospital Admission: Y  -KT Side: Left  -KT Orientation: posterior  -KT Location: hand  -KT Primary Wound Type: Skin tear  -KT    Dressing Appearance  intact;dry  -VS  intact;dry  -KK  open to air;no drainage  -RB    Closure  EARL  -VS  --  --    Base  dressing in place, unable to visualize  -VS  dressing in place, unable to visualize  -KK  --       Wound 06/05/20 1855 thoracic spine    Wound - Properties Group Date first assessed: 06/05/20  -KT Time first assessed: 1855  -KT Present on Hospital Admission: Y  -KT Location: thoracic spine  -KT Stage, Pressure Injury: Stage 1  -KT    Dressing Appearance  dry;intact  -VS  dry;intact  -KK  dry;intact  -RB    Closure  Adhesive bandage  -VS  Adhesive bandage  -KK  EARL  -RB    Base  dressing in place, unable to visualize  -VS  pink  -KK  --       Wound 06/05/20 1940 thoracic spine    Wound - Properties Group Date first assessed: 06/05/20  -KT, Picture with stage 1 picture  Time first assessed: 1940  -KT Present on Hospital Admission: Y  -KT Location: thoracic spine  -KT Stage, Pressure Injury: Stage 2  -KT    Dressing Appearance  --  -VS  dry;intact  -KK  dry;intact  -RB    Base  --  -VS  pink  -KK  --       Wound 06/06/20 0251 Left anterior knee Abrasion    Wound - Properties Group Date first assessed: 06/06/20  -KW Time first assessed: 0251  -KW Side: Left  -KW Orientation: anterior  -KW Location: knee  -KW Primary Wound Type: Abrasion  -KW    Dressing Appearance  open to air  -VS  open to air  -KK  open to air  -RB    Closure  Open to air  -VS  --  --    Base  dry;scab  -VS  dry;scab  -KK  --      User Key  (r) = Recorded By, (t) = Taken By, (c) = Cosigned By    Initials Name Provider Type    Leticia Hall, RN Registered Nurse    VS Sturgeon, Valerie, LPN Licensed Nurse    MOHAN Chavarria  MERRILL Parker Licensed Nurse    Yasir Lucas, RN Registered Nurse    Geraldine Haney LPN Licensed Nurse    Renita Ya, RN Registered Nurse          Discharge Diagnosis:     Active Hospital Problems    Diagnosis  POA   • **Syncope and collapse [R55]  Yes     Priority: High   • Chronic narcotic dependence (CMS/HCC) [F11.20]  Yes     Priority: Medium   • Polypharmacy [Z79.899]  Not Applicable     Priority: Medium   • COVID-19 virus detected [U07.1]  Yes     Priority: Medium   • Diabetes mellitus (CMS/HCC) [E11.9]  Yes   • Hyperlipidemia [E78.5]  Yes   • Seizure disorder (CMS/HCC) [G40.909]  Yes   • Paroxysmal atrial fibrillation (CMS/HCC) [I48.0]  Yes   • Essential hypertension [I10]  Yes   • Dementia (CMS/HCC) [F03.90]  Yes   • Chronic obstructive lung disease (CMS/HCC) [J44.9]  Yes   • Anemia [D64.9]  Yes   • Cardiac pacemaker in situ [Z95.0]  Yes      Resolved Hospital Problems   No resolved problems to display.       Estimated Creatinine Clearance: 65.1 mL/min (A) (by C-G formula based on SCr of 0.59 mg/dL (L)).    Discharge Disposition    Destination - Selection Complete      Service Provider Request Status Selected Services Address Phone Number Fax Number    [facility] [place] Selected [facility]illed Nursing [address] [place] [address] [place] [address] [phone]    [facility] Pending - Request Sent N/A [address] [facility] [place] [address] [phone]      Durable Medical Equipment      Coordination has not been started for this encounter.      Dialysis/Infusion      Coordination has not been started for this encounter.      Home Medical Care      Service Provider Request Status Selected Services Address Phone Number Fax Number    [facility] [address] [facility] [place] [address] [phone] --      Therapy      Coordination has not been started for this encounter.      [facility]  Resources      Coordination has not been started for this encounter.              PT Recommendation and Plan          Long Term Care (DC - External)           Discharge Medications      New Medications      Instructions Start Date   collagenase 250 UNIT/GM ointment  Commonly known as:  Santyl   Topical, Every 24 Hours Scheduled   Start Date:  June 9, 2020        Changes to Medications      Instructions Start Date   albuterol sulfate  (90 Base) MCG/ACT inhaler  Commonly known as:  PROVENTIL HFA;VENTOLIN HFA;PROAIR HFA  What changed:  Another medication with the same name was removed. Continue taking this medication, and follow the directions you see here.   1 puff, Inhalation, Every 4 Hours PRN      morphine sulfate (concentrate) 10 MG/0.5ML solution oral solution  What changed:    · when to take this  · reasons to take this   5 mg, Oral, Every 6 Hours PRN         Continue These Medications      Instructions Start Date   acetaminophen 325 MG tablet  Commonly known as:  TYLENOL   650 mg, Oral, Every 6 Hours PRN      aluminum-magnesium hydroxide-simethicone 200-200-20 MG/5ML suspension  Commonly known as:  MAALOX/MYLANTA   30 mL, Oral, Every 6 Hours PRN      aspirin 81 MG EC tablet   81 mg, Oral, Daily      atorvastatin 20 MG tablet  Commonly known as:  LIPITOR   20 mg, Oral, Nightly      clopidogrel 75 MG tablet  Commonly known as:  PLAVIX   75 mg, Oral, Daily      digoxin 250 MCG tablet  Commonly known as:  LANOXIN   250 mcg, Oral, Daily Digoxin      divalproex 500 MG DR tablet  Commonly known as:  DEPAKOTE   500 mg, Oral, 2 Times Daily      donepezil 23 MG tablet  Commonly known as:  ARICEPT   23 mg, Oral, Nightly      escitalopram 5 MG tablet  Commonly known as:  LEXAPRO   5 mg, Oral, Daily      furosemide 40 MG tablet  Commonly known as:  LASIX   40 mg, Oral, Daily      isosorbide mononitrate 30 MG 24 hr tablet  Commonly known as:  IMDUR   30 mg, Oral, Daily      Medihoney Wound/Burn Dressing gel   Apply  externally, Daily, Apply to coccyx and secure with boarder gauze       memantine 10 MG tablet  Commonly known as:  NAMENDA   10 mg, Oral, 2 Times Daily      potassium chloride 20 MEQ CR tablet  Commonly known as:  K-DUR,KLOR-CON   20 mEq, Oral, Daily         Stop These Medications    guaiFENesin 600 MG 12 hr tablet  Commonly known as:  MUCINEX     LORazepam 0.5 MG tablet  Commonly known as:  ATIVAN     magnesium hydroxide 400 MG/5ML suspension  Commonly known as:  MILK OF MAGNESIA          Discharge medications personally reviewed by me and med rec done by me personally.  06/08/20, 12:37 PM        Consults:   Consults     Date and Time Order Name Status Description    6/5/2020 0235 Inpatient Hospitalist Consult Completed           Procedures Performed:         Pertinent Test Results:   Results from last 7 days   Lab Units 06/08/20 0306 06/07/20 0439 06/06/20 0313 06/05/20 0648 06/05/20  0043   WBC 10*3/mm3 8.20 6.00 6.60 6.30 6.90   HEMOGLOBIN g/dL 10.4* 10.2* 9.9* 10.3* 9.4*   HEMATOCRIT % 31.5* 30.9* 30.6* 32.5* 29.5*   MCV fL 96.5 95.1 95.7 96.5 95.1   MCH pg 31.7 31.3 31.0 30.6 30.3   PLATELETS 10*3/mm3 96* 90* 89* 87* 89*   MONOCYTES % %  --   --   --  17.0* 24.0*     Results from last 7 days   Lab Units 06/08/20 0306 06/07/20 0439 06/06/20 0313  06/05/20  0043   SODIUM mmol/L 137 138 137   < > 139   POTASSIUM mmol/L 3.9 3.9 4.0   < > 3.8   CHLORIDE mmol/L 99 103 103   < > 104   CO2 mmol/L 26.0 28.0 26.0   < > 29.0   BUN  11 11 9   < > 12   CREATININE mg/dL 0.59* 0.57* 0.64*   < > 0.64*   CALCIUM mg/dL 8.9 8.7 8.7   < > 8.3*   BILIRUBIN mg/dL  --   --   --   --  0.3   ALK PHOS U/L  --   --   --   --  79   ALT (SGPT) U/L  --   --   --   --  15   AST (SGOT) U/L  --   --   --   --  17   GLUCOSE mg/dL 102* 105* 112*   < > 105*    < > = values in this interval not displayed.     Lab Results   Component Value Date    CALCIUM 8.9 06/08/2020     Hemoglobin A1C   Date Value Ref Range Status   06/06/2020 5.4 3.5 -  5.6 % Final     Lab Results   Component Value Date    CHOL 138 09/21/2018    TRIG 55 09/21/2018    HDL 71 09/21/2018    LDL 44 09/21/2018     No results found for: LIPASE        No results found for: INTRAOP, PREDX, FINALDX, COMDX  Inflammatory Biomarkers        Invalid input(s): ESR, D-DIMER QUANTITATIVE,  PROCALCITONIN  COVID19   Date Value Ref Range Status   06/07/2020 Detected (C) Not Detected - Ref. Range Final        Microbiology Results (last 10 days)     Procedure Component Value - Date/Time    COVID-19,[facility] IN-HOUSE, NP SWAB IN TRANSPORT MEDIA 8-12 HR TAT - Swab, Nasopharynx [[id number]]  (Abnormal) Collected:  06/07/20 0955    Lab Status:  Final result Specimen:  Swab from Nasopharynx Updated:  06/07/20 1845     COVID19 Detected    COVID-19,[facility]-HOUSE, NP SWAB IN TRANSPORT MEDIA 8-12 HR TAT - Swab, Nasopharynx [[id number]]  (Normal) Collected:  06/06/20 0921    Lab Status:  Final result Specimen:  Swab from Nasopharynx Updated:  06/06/20 1308     COVID19 Not Detected          ECG/EMG Results (most recent)     Procedure Component Value Units Date/Time    Adult Transthoracic Echo Complete With Contrast if Necessary Per Protocol (With Agitated Saline) [[id number]] Collected:  06/05/20 0801     Updated:  06/05/20 1113     BSA 1.7 m^2      RVIDd 3.6 cm      IVSd 1.1 cm      IVSs 1.7 cm      LVIDd 4.6 cm      LVIDs 2.7 cm      LVPWd 1.1 cm       CV ECHO DEBORAH - LVPWS 1.4 cm      IVS/LVPW 0.98     FS 41.3 %      EDV(Teich) 95.4 ml      ESV(Teich) 26.5 ml      EF(Teich) 72.3 %      EDV(cubed) 94.9 ml      ESV(cubed) 19.2 ml      EF(cubed) 79.8 %      % IVS thick 49.4 %      % LVPW thick 25.9 %      LV mass(C)d 183.2 grams      LV mass(C)dI 107.8 grams/m^2      LV mass(C)s 143.3 grams      LV mass(C)sI 84.3 grams/m^2      SV(Teich) 69.0 ml      SI(Teich) 40.6 ml/m^2      SV(cubed) 75.7 ml      SI(cubed) 44.6 ml/m^2      Ao root diam 3.5 cm      Ao root area 9.7 cm^2      ACS 2.0 cm      LVOT diam 2.3 cm       LVOT area 4.2 cm^2      EDV(MOD-sp4) 68.3 ml      ESV(MOD-sp4) 26.5 ml      EF(MOD-sp4) 61.2 %      SV(MOD-sp4) 41.8 ml      SI(MOD-sp4) 24.6 ml/m^2      Ao root area (BSA corrected) 2.1     LV Bermudez Vol (BSA corrected) 40.2 ml/m^2      LV Sys Vol (BSA corrected) 15.6 ml/m^2      Aortic R-R 0.23 sec      Aortic .8 BPM      MV E max mike 76.5 cm/sec      MV A max mike 104.0 cm/sec      MV E/A 0.74     MV V2 max 104.5 cm/sec      MV max PG 4.4 mmHg      MV V2 mean 60.6 cm/sec      MV mean PG 1.7 mmHg      MV V2 VTI 17.6 cm      MVA(VTI) 3.3 cm^2      MV dec slope 355.8 cm/sec^2      MV dec time 0.22 sec      Ao pk mike 96.4 cm/sec      Ao max PG 3.7 mmHg      Ao max PG (full) 1.4 mmHg      Ao V2 mean 73.9 cm/sec      Ao mean PG 2.3 mmHg      Ao mean PG (full) 0.97 mmHg      Ao V2 VTI 16.9 cm      RUTHANN(I,A) 3.5 cm^2      RUTHANN(I,D) 3.5 cm^2      RUTHANN(V,A) 3.3 cm^2      RUTHANN(V,D) 3.3 cm^2      LV V1 max PG 2.4 mmHg      LV V1 mean PG 1.4 mmHg      LV V1 max 76.8 cm/sec      LV V1 mean 54.4 cm/sec      LV V1 VTI 14.1 cm      CO(Ao) 43.1 l/min      CI(Ao) 25.4 l/min/m^2      SV(Ao) 164.1 ml      SI(Ao) 96.5 ml/m^2      CO(LVOT) 15.4 l/min      CI(LVOT) 9.1 l/min/m^2      SV(LVOT) 58.7 ml      SI(LVOT) 34.5 ml/m^2      PA V2 max 80.4 cm/sec      PA max PG 2.6 mmHg      PA max PG (full) 0.7 mmHg      PA V2 mean 57.5 cm/sec      PA mean PG 1.5 mmHg      PA mean PG (full) 0.54 mmHg      PA V2 VTI 17.1 cm      PA acc time 0.11 sec      RV V1 max PG 1.9 mmHg      RV V1 mean PG 0.91 mmHg      RV V1 max 68.6 cm/sec      RV V1 mean 44.5 cm/sec      RV V1 VTI 12.8 cm      TR max mike 248.0 cm/sec      RVSP(TR) 27.6 mmHg      RAP systole 3.0 mmHg      PA pr(Accel) 27.6 mmHg       CV ECHO DEBORAH - BZI_BMI 29.5 kilograms/m^2       CV ECHO DEBORAH - BSA([facility]) 1.8 m^2       CV ECHO DEBORAH - BZI_METRIC_WEIGHT 70.8 kg       CV ECHO DEBORAH - BZI_METRIC_HEIGHT 154.9 cm      Target HR (85%) 113 bpm      Max. Pred. HR (100%) 133 bpm       EF(MOD-bp) 72.0 %      LA dimension(2D) 3.6 cm     Narrative:         · Left ventricular wall thickness is consistent with mild concentric   hypertrophy.  · Left ventricular systolic function is normal.  · Left ventricular diastolic dysfunction (grade I a) consistent with   impaired relaxation.  · Mild tricuspid valve regurgitation is present.     Normal LV and RV size and function  Mild concentric LVH  Grade 1 diastolic dysfunction by criteria  Normal atrial sizes  Normal IVC caliber in diameter and collapsibility with normal right sided   pressures estimated, no significant pulmonary hypertension seen  EF estimated 65 to 70%  Mild TR, trace to mild MR, trace AI  No significant valvular stenoses or elevated gradients seen  No masses  No pericardial effusion seen      ECG 12 Lead [[id number]] Collected:  06/05/20 0040     Updated:  06/06/20 0936    Narrative:       HEART RATE= 82  bpm  RR Interval= 728  ms  TX Interval= 210  ms  P Horizontal Axis=   deg  P Front Axis= 79  deg  QRSD Interval= 88  ms  QT Interval= 344  ms  QRS Axis= 62  deg  T Wave Axis= 74  deg  - BORDERLINE ECG -  Sinus rhythm  Low voltage with right axis deviation  When compared with ECG of 20-Nov-2019 9:50:58,  Significant rate increase  Significant axis, voltage or hypertrophy change  Electronically Signed By: Mike Bravo ([place]) 06-Jun-2020 09:28:14  Date and Time of Study: 2020-06-05 00:40:51    ECG 12 Lead [[id number]] Collected:  06/06/20 0552     Updated:  06/07/20 1053    Narrative:       HEART RATE= 72  bpm  RR Interval= 828  ms  TX Interval= 203  ms  P Horizontal Axis= -44  deg  P Front Axis= 70  deg  QRSD Interval= 95  ms  QT Interval= 373  ms  QRS Axis= 33  deg  T Wave Axis= 43  deg  - ABNORMAL ECG -  Sinus rhythm  Anterior infarct, old  When compared with ECG of 05-Jun-2020 0:40:51,  Significant axis, voltage or hypertrophy change  Electronically Signed By: Greg Chen ([facility]) 07-Jun-2020 10:39:15  Date and Time of Study: 2020-06-06  05:52:02          Results for orders placed during the hospital encounter of 06/05/20   Bilateral Carotid Duplex    Narrative · Proximal right internal carotid artery plaque without significant   stenosis.  · Proximal left internal carotid artery plaque without significant   stenosis.          Results for orders placed during the hospital encounter of 06/05/20   Adult Transthoracic Echo Complete With Contrast if Necessary Per Protocol (With Agitated Saline)    Narrative · Left ventricular wall thickness is consistent with mild concentric   hypertrophy.  · Left ventricular systolic function is normal.  · Left ventricular diastolic dysfunction (grade I a) consistent with   impaired relaxation.  · Mild tricuspid valve regurgitation is present.     Normal LV and RV size and function  Mild concentric LVH  Grade 1 diastolic dysfunction by criteria  Normal atrial sizes  Normal IVC caliber in diameter and collapsibility with normal right sided   pressures estimated, no significant pulmonary hypertension seen  EF estimated 65 to 70%  Mild TR, trace to mild MR, trace AI  No significant valvular stenoses or elevated gradients seen  No masses  No pericardial effusion seen         Ct Head Without Contrast    Result Date: 6/4/2020  1. No acute intracranial process. Frontal scalp soft tissue swelling/hematoma. 2. Mild changes small vessel ischemic disease of indeterminate age, presumably mostly chronic. Volume loss. Atherosclerosis. 3. Left mastoid effusion. Paranasal sinus disease.  Electronically signed by:  Jairon Sykes M.D.  6/4/2020 11:32 PM    Ct Cervical Spine Without Contrast    Result Date: 6/4/2020  1. No acute osseous abnormality. Reversal normal lordotic curvature. Osteopenia. Multilevel degenerative changes. 2. Small to moderate left pleural effusion with atelectasis.  Electronically signed by:  Jairon Sykes M.D.  6/4/2020 11:38 PM    Xr Chest 1 View    Result Date: 6/5/2020  1. Left retrocardiac  airspace opacity likely representing left lower lobe atelectasis. 2. Small left-sided pleural effusion.  Electronically Signed By-Fabiano Caballero On:6/5/2020 7:39 AM This report was finalized on [id number] by  Fabiano Caballero .    Xr Pelvis 1 Or 2 View    Result Date: 6/5/2020  1. No acute fracture or dislocation. 2. Severe left hip osteoarthritis  Electronically Signed By-Fabiano Caballero On:6/5/2020 7:41 AM This report was finalized on [id number] by  Fabiano [place] .      Xrays, labs reviewed personally by me.  06/08/20  12:37 PM      Condition on Discharge:    Stable    Discharge Diet:   Dietary Orders (From admission, onward)     Start     Ordered    06/05/20 0422  Diet Cardiac; Healthy Heart  Diet Effective Now     Question Answer Comment   Diet / Texture / Consistency Cardiac    Select Type: Healthy Heart        06/05/20 0216                Activity at Discharge:   Activity Instructions     Activity as Tolerated            Follow-up Appointments    No future appointments.    Additional Instructions for the Follow-ups that You Need to Schedule     Discharge Follow-up with PCP   As directed       Currently Documented PCP:    Rolan Amin MD    PCP Phone Number:    [phone]     Follow Up Details:  If no PCP, call MD finder at [phone]            Contact information for follow-up providers     Rolan Amin MD .    Specialty:  Internal Medicine  Why:  If no PCP, call MD finder at [phone]  Contact information:  [address]  [place] [address] [phone]                   Contact information for after-discharge care     Destination     [facility] .    Service:  Skilled Nursing  Contact information:  [address] [facility] [address] [phone]                 Home Medical Care     [facility] .    Service:  Home Hospice  Contact information:  [address] [facility] [address] [phone]                               Test Results Pending at Discharge       Risk for Readmission (LACE) Score: 9 (6/8/2020  6:00 AM)        Time: I spent  more than 35  minutes on this discharge activity which included: face-to-face encounter with the patient, reviewing the data in the system, coordination of the care with the nursing staff as well as consultants, documentation, and entering orders.   Care coordination with nursing for discharge planning and with the  for the same.        Fabien Lopez MD  06/08/20  12:37

## 2020-06-08 NOTE — PLAN OF CARE
Problem: Patient Care Overview  Goal: Plan of Care Review  Outcome: Ongoing (interventions implemented as appropriate)  Flowsheets (Taken 6/8/2020 1401)  Progress: improving  Plan of Care Reviewed With: patient  Outcome Summary: pt to be discharged back to [facility] this evening

## 2020-06-09 NOTE — PROGRESS NOTES
Case Management Discharge Note      Final Note: [facility]         Destination - Selection Complete      Service Provider Request Status Selected Services Address Phone Number Fax Number    [facility] [address] [address] [phone]      Home Medical Care      Service Provider Request Status Selected Services Address Phone Number Fax Number    [facility] [address] [place] [address] [phone] --     Final Discharge Disposition Code: 04 - intermediate care facility